# Patient Record
Sex: FEMALE | Race: WHITE | NOT HISPANIC OR LATINO
[De-identification: names, ages, dates, MRNs, and addresses within clinical notes are randomized per-mention and may not be internally consistent; named-entity substitution may affect disease eponyms.]

---

## 2017-11-02 ENCOUNTER — RESULT REVIEW (OUTPATIENT)
Age: 47
End: 2017-11-02

## 2018-01-31 ENCOUNTER — RESULT REVIEW (OUTPATIENT)
Age: 48
End: 2018-01-31

## 2018-08-21 ENCOUNTER — OUTPATIENT (OUTPATIENT)
Dept: OUTPATIENT SERVICES | Facility: HOSPITAL | Age: 48
LOS: 1 days | End: 2018-08-21
Payer: COMMERCIAL

## 2018-08-21 ENCOUNTER — APPOINTMENT (OUTPATIENT)
Dept: ULTRASOUND IMAGING | Facility: IMAGING CENTER | Age: 48
End: 2018-08-21
Payer: COMMERCIAL

## 2018-08-21 DIAGNOSIS — Z00.8 ENCOUNTER FOR OTHER GENERAL EXAMINATION: ICD-10-CM

## 2018-08-21 PROCEDURE — C1739: CPT

## 2018-08-21 PROCEDURE — 19285 PERQ DEV BREAST 1ST US IMAG: CPT

## 2018-08-21 PROCEDURE — 19285 PERQ DEV BREAST 1ST US IMAG: CPT | Mod: RT

## 2018-08-28 ENCOUNTER — OUTPATIENT (OUTPATIENT)
Dept: OUTPATIENT SERVICES | Facility: HOSPITAL | Age: 48
LOS: 1 days | End: 2018-08-28
Payer: COMMERCIAL

## 2018-08-28 VITALS
RESPIRATION RATE: 16 BRPM | OXYGEN SATURATION: 100 % | DIASTOLIC BLOOD PRESSURE: 64 MMHG | WEIGHT: 115.08 LBS | HEART RATE: 72 BPM | SYSTOLIC BLOOD PRESSURE: 98 MMHG | HEIGHT: 69 IN | TEMPERATURE: 98 F

## 2018-08-28 DIAGNOSIS — N63.0 UNSPECIFIED LUMP IN UNSPECIFIED BREAST: ICD-10-CM

## 2018-08-28 DIAGNOSIS — Z01.818 ENCOUNTER FOR OTHER PREPROCEDURAL EXAMINATION: ICD-10-CM

## 2018-08-28 DIAGNOSIS — D24.1 BENIGN NEOPLASM OF RIGHT BREAST: ICD-10-CM

## 2018-08-28 LAB
HCT VFR BLD CALC: 35.9 % — SIGNIFICANT CHANGE UP (ref 34.5–45)
HGB BLD-MCNC: 11.5 G/DL — SIGNIFICANT CHANGE UP (ref 11.5–15.5)
MCHC RBC-ENTMCNC: 30.4 PG — SIGNIFICANT CHANGE UP (ref 27–34)
MCHC RBC-ENTMCNC: 32 GM/DL — SIGNIFICANT CHANGE UP (ref 32–36)
MCV RBC AUTO: 95 FL — SIGNIFICANT CHANGE UP (ref 80–100)
PLATELET # BLD AUTO: 298 K/UL — SIGNIFICANT CHANGE UP (ref 150–400)
RBC # BLD: 3.78 M/UL — LOW (ref 3.8–5.2)
RBC # FLD: 13.4 % — SIGNIFICANT CHANGE UP (ref 10.3–14.5)
WBC # BLD: 5.37 K/UL — SIGNIFICANT CHANGE UP (ref 3.8–10.5)
WBC # FLD AUTO: 5.37 K/UL — SIGNIFICANT CHANGE UP (ref 3.8–10.5)

## 2018-08-28 PROCEDURE — G0463: CPT

## 2018-08-28 PROCEDURE — 85027 COMPLETE CBC AUTOMATED: CPT

## 2018-08-28 RX ORDER — SODIUM CHLORIDE 9 MG/ML
3 INJECTION INTRAMUSCULAR; INTRAVENOUS; SUBCUTANEOUS EVERY 8 HOURS
Qty: 0 | Refills: 0 | Status: DISCONTINUED | OUTPATIENT
Start: 2018-09-06 | End: 2018-09-21

## 2018-08-28 RX ORDER — LIDOCAINE HCL 20 MG/ML
0.2 VIAL (ML) INJECTION ONCE
Qty: 0 | Refills: 0 | Status: DISCONTINUED | OUTPATIENT
Start: 2018-09-06 | End: 2018-09-21

## 2018-08-28 NOTE — H&P PST ADULT - NSANTHOSAYNRD_GEN_A_CORE
No. KHLOE screening performed.  STOP BANG Legend: 0-2 = LOW Risk; 3-4 = INTERMEDIATE Risk; 5-8 = HIGH Risk

## 2018-08-28 NOTE — H&P PST ADULT - ATTENDING COMMENTS
The patient is a 48 year old female who recently underwent a right 12:00 breast FNA with pathology revealing atypical findings, cannot rule out papillary lesion.  She presents to undergo a right hamlet  localization breast biopsy.    The above physical notes a right breast mass. On my exam, no right breast mass noted.

## 2018-08-28 NOTE — H&P PST ADULT - HISTORY OF PRESENT ILLNESS
48 year old female  reports having abnormal mammogram/sonogram with right breast lump, scheduled for biopsy ^& excision on 09/06/18.

## 2018-08-31 ENCOUNTER — RESULT REVIEW (OUTPATIENT)
Age: 48
End: 2018-08-31

## 2018-09-05 ENCOUNTER — TRANSCRIPTION ENCOUNTER (OUTPATIENT)
Age: 48
End: 2018-09-05

## 2018-09-06 ENCOUNTER — RESULT REVIEW (OUTPATIENT)
Age: 48
End: 2018-09-06

## 2018-09-06 ENCOUNTER — OUTPATIENT (OUTPATIENT)
Dept: OUTPATIENT SERVICES | Facility: HOSPITAL | Age: 48
LOS: 1 days | End: 2018-09-06
Payer: COMMERCIAL

## 2018-09-06 VITALS
WEIGHT: 115.08 LBS | SYSTOLIC BLOOD PRESSURE: 18 MMHG | OXYGEN SATURATION: 100 % | HEART RATE: 52 BPM | HEIGHT: 69 IN | RESPIRATION RATE: 16 BRPM | TEMPERATURE: 98 F | DIASTOLIC BLOOD PRESSURE: 52 MMHG

## 2018-09-06 VITALS
SYSTOLIC BLOOD PRESSURE: 98 MMHG | RESPIRATION RATE: 18 BRPM | OXYGEN SATURATION: 100 % | HEART RATE: 64 BPM | DIASTOLIC BLOOD PRESSURE: 56 MMHG | TEMPERATURE: 98 F

## 2018-09-06 DIAGNOSIS — Z01.818 ENCOUNTER FOR OTHER PREPROCEDURAL EXAMINATION: ICD-10-CM

## 2018-09-06 DIAGNOSIS — D24.1 BENIGN NEOPLASM OF RIGHT BREAST: ICD-10-CM

## 2018-09-06 PROCEDURE — 88307 TISSUE EXAM BY PATHOLOGIST: CPT | Mod: 26

## 2018-09-06 PROCEDURE — 19125 EXCISION BREAST LESION: CPT | Mod: RT

## 2018-09-06 PROCEDURE — 76098 X-RAY EXAM SURGICAL SPECIMEN: CPT

## 2018-09-06 PROCEDURE — 76098 X-RAY EXAM SURGICAL SPECIMEN: CPT | Mod: 26

## 2018-09-06 PROCEDURE — 88307 TISSUE EXAM BY PATHOLOGIST: CPT

## 2018-09-06 RX ORDER — ACETAMINOPHEN 500 MG
1000 TABLET ORAL ONCE
Qty: 0 | Refills: 0 | Status: COMPLETED | OUTPATIENT
Start: 2018-09-06 | End: 2018-09-06

## 2018-09-06 RX ORDER — CELECOXIB 200 MG/1
200 CAPSULE ORAL ONCE
Qty: 0 | Refills: 0 | Status: COMPLETED | OUTPATIENT
Start: 2018-09-06 | End: 2018-09-06

## 2018-09-06 RX ORDER — CELECOXIB 200 MG/1
200 CAPSULE ORAL ONCE
Qty: 0 | Refills: 0 | Status: DISCONTINUED | OUTPATIENT
Start: 2018-09-06 | End: 2018-09-21

## 2018-09-06 RX ORDER — OXYCODONE HYDROCHLORIDE 5 MG/1
10 TABLET ORAL ONCE
Qty: 0 | Refills: 0 | Status: DISCONTINUED | OUTPATIENT
Start: 2018-09-06 | End: 2018-09-06

## 2018-09-06 RX ORDER — OXYCODONE HYDROCHLORIDE 5 MG/1
5 TABLET ORAL ONCE
Qty: 0 | Refills: 0 | Status: DISCONTINUED | OUTPATIENT
Start: 2018-09-06 | End: 2018-09-06

## 2018-09-06 RX ORDER — SODIUM CHLORIDE 9 MG/ML
1000 INJECTION, SOLUTION INTRAVENOUS
Qty: 0 | Refills: 0 | Status: DISCONTINUED | OUTPATIENT
Start: 2018-09-06 | End: 2018-09-21

## 2018-09-06 RX ORDER — ONDANSETRON 8 MG/1
4 TABLET, FILM COATED ORAL ONCE
Qty: 0 | Refills: 0 | Status: DISCONTINUED | OUTPATIENT
Start: 2018-09-06 | End: 2018-09-21

## 2018-09-06 RX ADMIN — Medication 1000 MILLIGRAM(S): at 12:11

## 2018-09-06 RX ADMIN — CELECOXIB 200 MILLIGRAM(S): 200 CAPSULE ORAL at 12:11

## 2018-09-06 NOTE — ASU PATIENT PROFILE, ADULT - VISION (WITH CORRECTIVE LENSES IF THE PATIENT USUALLY WEARS THEM):
Normal vision: sees adequately in most situations; can see medication labels, newsprint reading/Normal vision: sees adequately in most situations; can see medication labels, newsprint

## 2018-09-06 NOTE — BRIEF OPERATIVE NOTE - PROCEDURE
<<-----Click on this checkbox to enter Procedure Breast biopsy, right  09/06/2018  with hamlet  localization  Active  MENA

## 2018-09-12 LAB — SURGICAL PATHOLOGY STUDY: SIGNIFICANT CHANGE UP

## 2018-11-05 ENCOUNTER — RESULT REVIEW (OUTPATIENT)
Age: 48
End: 2018-11-05

## 2019-02-05 PROBLEM — N63.0 UNSPECIFIED LUMP IN UNSPECIFIED BREAST: Chronic | Status: ACTIVE | Noted: 2018-08-28

## 2019-02-05 PROBLEM — M25.552 LEFT HIP PAIN: Status: ACTIVE | Noted: 2019-02-05

## 2019-02-06 ENCOUNTER — APPOINTMENT (OUTPATIENT)
Dept: ORTHOPEDIC SURGERY | Facility: CLINIC | Age: 49
End: 2019-02-06
Payer: COMMERCIAL

## 2019-02-06 VITALS
DIASTOLIC BLOOD PRESSURE: 56 MMHG | SYSTOLIC BLOOD PRESSURE: 85 MMHG | HEIGHT: 69 IN | WEIGHT: 115 LBS | BODY MASS INDEX: 17.03 KG/M2 | HEART RATE: 60 BPM

## 2019-02-06 DIAGNOSIS — M25.551 PAIN IN RIGHT HIP: ICD-10-CM

## 2019-02-06 DIAGNOSIS — M25.552 PAIN IN LEFT HIP: ICD-10-CM

## 2019-02-06 DIAGNOSIS — Z78.9 OTHER SPECIFIED HEALTH STATUS: ICD-10-CM

## 2019-02-06 PROCEDURE — 73522 X-RAY EXAM HIPS BI 3-4 VIEWS: CPT

## 2019-02-06 PROCEDURE — 99204 OFFICE O/P NEW MOD 45 MIN: CPT

## 2019-02-06 PROCEDURE — 72100 X-RAY EXAM L-S SPINE 2/3 VWS: CPT

## 2019-02-06 NOTE — PHYSICAL EXAM
[de-identified] : The patient is well nourished, well-developed, in no acute distress, with appropriate mood and affect. The patient is oriented to time, place, and person. Respirations are even and unlabored. Gait evaluation does not reveal a limp. There is no inguinal adenopathy. \par The right limb is well-perfused and showed 2+ dp/pt pulses, without skin lesions, shows a grossly normal motor and sensory examination. Examination of the hip shows no skin lesions. Hip motion is full and painless from 0-90 degrees extension to flexion, 20 degrees adduction and 20 degrees abduction, and 15 degrees internal and 30 degrees external rotation. Stinchfield test is negative. FADIR test is negative. MELODY test is negative. The left limb is well-perfused and showed 2+ dp/pt pulses, without skin lesions, shows a grossly normal motor and sensory examination. Examination of the hip shows no skin lesions. Hip motion is full and painless from 0-90 degrees extension to flexion, 20 degrees adduction and 20 degrees abduction, and 15 degrees internal and 30 degrees external rotation. Stinchfield test is negative.  FADIR test is negative. MELODY test is negative. Leg lengths are approximately equal. Both hips are stable and muscle strength is normal with good strength with resisted abduction and adduction. Pedal pulses are palpable.\par Examination of the lumbar spine reveals full range of motion without pain. There is no tenderness to palpation of the osseous structures or paravertebral soft tissues.  Tender to palpation over the left buttock.  There is no muscle spasm. Straight leg raise is negative bilaterally. [de-identified] : AP and lateral x-rays of the bilateral hip, pelvis, and femur were ordered and taken in the office and demonstrate no evidence of degenerative joint disease of the hip with maintained joint space and no evidence of fractures or other intraarticular pathology.  \par \par AP and lateral x-rays of the lumbar spine were ordered and taken in the office and demonstrate no evidence of degenerative disc disease or spondylolysis or spondylolisthesis.

## 2019-02-06 NOTE — DISCUSSION/SUMMARY
[de-identified] : This patient has acute low back pain from a fall.  She may have a herniated disc versus a muscle spasm.  Given the lack of relief over time and the mild improvement with NSAIDs I will prescribe a Medrol Dosepak.  She will call me in 1 week if this is not improving and we will consider an MRI.  Follow-up PRN.

## 2019-02-06 NOTE — HISTORY OF PRESENT ILLNESS
[de-identified] : This is very nice 48-year-old female experiencing left low back pain, which is severe in intensity.  The pain started approximately 10 days ago after she flexed forward to pick something up off the ground.  From her memory she actually remembers falling onto the left knee about 2 weeks ago and she thinks this may have flared up the back.  She denies any numbness or tingling or weakness radiating down her legs to the feet.  No bowel or bladder incontinence.  No groin pain.  The pain substantially limits activities of daily living. Walking tolerance is reduced.  She takes NSAIDs for this which helped mildly.  She has not tried physical therapy for this.  She did try to go to a chiropractor which did not help.  She does not use a cane or walker.

## 2019-06-07 ENCOUNTER — APPOINTMENT (OUTPATIENT)
Dept: MRI IMAGING | Facility: CLINIC | Age: 49
End: 2019-06-07
Payer: COMMERCIAL

## 2019-06-07 ENCOUNTER — OUTPATIENT (OUTPATIENT)
Dept: OUTPATIENT SERVICES | Facility: HOSPITAL | Age: 49
LOS: 1 days | End: 2019-06-07
Payer: COMMERCIAL

## 2019-06-07 DIAGNOSIS — Z00.8 ENCOUNTER FOR OTHER GENERAL EXAMINATION: ICD-10-CM

## 2019-06-07 PROCEDURE — 73718 MRI LOWER EXTREMITY W/O DYE: CPT | Mod: 26,LT

## 2019-06-07 PROCEDURE — 73718 MRI LOWER EXTREMITY W/O DYE: CPT

## 2019-06-18 ENCOUNTER — APPOINTMENT (OUTPATIENT)
Dept: ORTHOPEDIC SURGERY | Facility: CLINIC | Age: 49
End: 2019-06-18
Payer: COMMERCIAL

## 2019-06-18 DIAGNOSIS — M54.5 LOW BACK PAIN: ICD-10-CM

## 2019-06-18 PROCEDURE — 99213 OFFICE O/P EST LOW 20 MIN: CPT

## 2019-06-18 NOTE — HISTORY OF PRESENT ILLNESS
[de-identified] : This is very nice 49-year-old female experiencing left low back pain, which is moderate in intensity.  I first evaluated her for this approximately 4 months ago and she is now feeling better from that.  That started after a fall.  I gave her Medrol Dosepak at the time and that significantly helped her radiculopathy.  She never followed up with a physiatry us.  Whenever she leans forward though or sits for long period of time in a flexed forward position she develops the pain in her buttock again.  Intimately will radiate down her left leg to the foot down the posterior aspect of the leg.  This is not associated with numbness or tingling or weakness.  No bowel or bladder incontinence.  No groin pain.  Is not currently taking pain medication.  She is previously tried a course of physical therapy which did not help to make the pain better.  Ultimately she will take NSAIDs which do help but that causes reflux symptoms. The pain mildly limits activities of daily living. Walking tolerance is not reduced.  She did try to go to a chiropractor which did not help.  She does not use a cane or walker.

## 2019-06-18 NOTE — PHYSICAL EXAM
[de-identified] : AP and lateral x-rays of the bilateral hip, pelvis, and femur were reviewed from the previous visit and demonstrate no evidence of degenerative joint disease of the hip with maintained joint space and no evidence of fractures or other intraarticular pathology.  \par \par AP and lateral x-rays of the lumbar spine were reviewed from the previous visit and demonstrate no evidence of degenerative disc disease or spondylolysis or spondylolisthesis. [de-identified] : The patient is well nourished, well-developed, in no acute distress, with appropriate mood and affect. The patient is oriented to time, place, and person. Respirations are even and unlabored. Gait evaluation does not reveal a limp. There is no inguinal adenopathy. \par The right limb is well-perfused and showed 2+ dp/pt pulses, without skin lesions, shows a grossly normal motor and sensory examination. Examination of the hip shows no skin lesions. Hip motion is full and painless from 0-90 degrees extension to flexion, 20 degrees adduction and 20 degrees abduction, and 15 degrees internal and 30 degrees external rotation. Stinchfield test is negative. FADIR test is negative. MELODY test is negative. The left limb is well-perfused and showed 2+ dp/pt pulses, without skin lesions, shows a grossly normal motor and sensory examination. Examination of the hip shows no skin lesions. Hip motion is full and painless from 0-90 degrees extension to flexion, 20 degrees adduction and 20 degrees abduction, and 15 degrees internal and 30 degrees external rotation. Stinchfield test is negative.  FADIR test is negative. MELODY test is negative. Leg lengths are approximately equal. Both hips are stable and muscle strength is normal with good strength with resisted abduction and adduction. Pedal pulses are palpable.\par Examination of the lumbar spine reveals full range of motion without pain. There is no tenderness to palpation of the osseous structures or paravertebral soft tissues.  Tender to palpation over the left buttock and over the left sacroiliac joint.  There is no muscle spasm. Straight leg raise is negative bilaterally.

## 2019-06-18 NOTE — DISCUSSION/SUMMARY
[de-identified] : This patient has acute low back pain with evidence of radiculopathy on examination.  Given the chronicity of the symptoms and the fact that she is Jeffery failed a course of conservative management I would like to obtain an MRI of the lumbar spine to evaluate for herniated disc.  I told the patient I will discuss with her over the telephone the MRI imaging once it is complete and that we we can discuss next steps.  Likely she will need to follow-up with a physiatry us for selective nerve root injections.

## 2019-06-20 ENCOUNTER — OTHER (OUTPATIENT)
Age: 49
End: 2019-06-20

## 2019-07-08 ENCOUNTER — OUTPATIENT (OUTPATIENT)
Dept: OUTPATIENT SERVICES | Facility: HOSPITAL | Age: 49
LOS: 1 days | End: 2019-07-08
Payer: COMMERCIAL

## 2019-07-08 ENCOUNTER — APPOINTMENT (OUTPATIENT)
Dept: MRI IMAGING | Facility: CLINIC | Age: 49
End: 2019-07-08
Payer: COMMERCIAL

## 2019-07-08 DIAGNOSIS — M54.5 LOW BACK PAIN: ICD-10-CM

## 2019-07-08 DIAGNOSIS — R59.1 GENERALIZED ENLARGED LYMPH NODES: ICD-10-CM

## 2019-07-08 PROCEDURE — 72148 MRI LUMBAR SPINE W/O DYE: CPT | Mod: 26

## 2019-07-08 PROCEDURE — 72148 MRI LUMBAR SPINE W/O DYE: CPT

## 2019-07-26 ENCOUNTER — CLINICAL ADVICE (OUTPATIENT)
Age: 49
End: 2019-07-26

## 2019-11-14 ENCOUNTER — RESULT REVIEW (OUTPATIENT)
Age: 49
End: 2019-11-14

## 2020-09-15 ENCOUNTER — APPOINTMENT (OUTPATIENT)
Dept: ORTHOPEDIC SURGERY | Facility: CLINIC | Age: 50
End: 2020-09-15
Payer: COMMERCIAL

## 2020-09-15 VITALS — TEMPERATURE: 98.6 F

## 2020-09-15 PROCEDURE — 99214 OFFICE O/P EST MOD 30 MIN: CPT

## 2020-09-15 PROCEDURE — 73564 X-RAY EXAM KNEE 4 OR MORE: CPT | Mod: 50

## 2020-09-15 RX ORDER — FLUTICASONE PROPIONATE 50 UG/1
50 SPRAY, METERED NASAL
Qty: 16 | Refills: 0 | Status: ACTIVE | COMMUNITY
Start: 2020-03-30

## 2020-09-15 NOTE — DISCUSSION/SUMMARY
[de-identified] : This patient has normal bilateral knee exams and normal radiographs.  She may have some extra-articular pain secondary to tight muscles from her lumbar radiculopathy.  An extensive discussion was conducted on the natural history of the disease and the variety of surgical and non-surgical options available to the patient including, but not limited to non-steroidal anti-inflammatory medications, steroid injections, physical therapy, maintenance of ideal body weight, and reduction of activity.  Recommended a course of physical therapy.  She can also he take meloxicam for 2 to 3 weeks for pain.  The patient will schedule an appointment as needed.\par

## 2020-09-15 NOTE — HISTORY OF PRESENT ILLNESS
[de-identified] : This is very nice 50-year-old female experiencing B/L knee pain x 3-4 days. She was last seen in June 2019 for L sided lower back pain with radiculopathy and was recommended to follow up with physiatry. Denies any trauma to her knees. Pain is located anteriorly. Describes pain as a burning sensation. Pain does not radiate. Laying down and bending her knee aggravates the pain. Denies clicking or popping sensation of her knees. Denies instability.  She has no pain during the day.  No pain with long distance walking.  This does not limit her activities of daily living.  It does not limit her walking tolerance.  No pain going up and down stairs.  No pain with squatting.  Only hurts at night when she lies down. Regarding the back pain, she recently started experiencing pain again when she went back to work as a . Prolonged sitting aggravates the pain. Pain radiates to L buttock but not down the leg. Denies numbness/tingling. She had went to the physiatrist last year which had recommended to PT, and states that PT relieved her back pain.

## 2020-09-15 NOTE — PHYSICAL EXAM
[de-identified] : Patient is well nourished, well-developed, in no acute distress, with appropriate mood and affect. The patient is oriented to time, place, and person. Respirations are even and unlabored. Gait evaluation does not reveal a limp. There is no inguinal adenopathy. The right limb is well-perfused and showed 2+ dp/pt pulses, without skin lesions, shows a grossly normal motor and sensory examination. Right knee motion is painless and the knee moves from 0 to 135 degrees. The knee is stable within that range-of-motion to AP and ML stress with a 1A Lachman, negative anterior or posterior drawer and no instability to varus or valgus stress. The alignment of the knee is 5 degrees varus. No effusion or crepitus is noted. No tenderness to palpation about the medial or lateral joint line, medial or lateral tibial plateau, medial or lateral femoral condyle, medial or lateral patellar facets, superior or inferior pole of the patella. Anne's is negative. Muscle strength is normal. Pedal pulses are palpable. Hip examination was negative. The left limb is well-perfused and showed 2+ dp/pt pulses, without skin lesions, shows a grossly normal motor and sensory examination. Left knee motion is painless and the knee moves from 0 to 135 degrees. The knee is stable within that range-of-motion to AP and ML stress with a 1A Lachman, negative anterior or posterior drawer and no instability to varus or valgus stress. The alignment of the knee is 5 degrees varus. No effusion or crepitus is noted. No tenderness to palpation about the medial or lateral joint line, medial or lateral tibial plateau, medial or lateral femoral condyle, medial or lateral patellar facets, superior or inferior pole of the patella. Anne's is negative. Muscle strength is normal. Pedal pulses are palpable. Hip examination was negative. [de-identified] : Long standing knee, AP knee, lateral knee, and patellar views of the bilateral knee were ordered and taken in the office and demonstrate no evidence of degenerative joint disease of the knee, fractures, intra-articular pathology.

## 2020-10-08 ENCOUNTER — TRANSCRIPTION ENCOUNTER (OUTPATIENT)
Age: 50
End: 2020-10-08

## 2020-10-11 ENCOUNTER — TRANSCRIPTION ENCOUNTER (OUTPATIENT)
Age: 50
End: 2020-10-11

## 2020-12-04 ENCOUNTER — TRANSCRIPTION ENCOUNTER (OUTPATIENT)
Age: 50
End: 2020-12-04

## 2021-01-29 ENCOUNTER — TRANSCRIPTION ENCOUNTER (OUTPATIENT)
Age: 51
End: 2021-01-29

## 2021-03-22 ENCOUNTER — APPOINTMENT (OUTPATIENT)
Dept: OBGYN | Facility: CLINIC | Age: 51
End: 2021-03-22

## 2021-03-26 ENCOUNTER — APPOINTMENT (OUTPATIENT)
Dept: PULMONOLOGY | Facility: CLINIC | Age: 51
End: 2021-03-26
Payer: COMMERCIAL

## 2021-03-26 VITALS
RESPIRATION RATE: 16 BRPM | BODY MASS INDEX: 17.63 KG/M2 | HEIGHT: 69 IN | SYSTOLIC BLOOD PRESSURE: 100 MMHG | DIASTOLIC BLOOD PRESSURE: 60 MMHG | WEIGHT: 119 LBS | OXYGEN SATURATION: 99 % | HEART RATE: 72 BPM | TEMPERATURE: 97 F

## 2021-03-26 DIAGNOSIS — Z83.49 FAMILY HISTORY OF OTHER ENDOCRINE, NUTRITIONAL AND METABOLIC DISEASES: ICD-10-CM

## 2021-03-26 DIAGNOSIS — Z83.3 FAMILY HISTORY OF DIABETES MELLITUS: ICD-10-CM

## 2021-03-26 DIAGNOSIS — Z86.19 PERSONAL HISTORY OF OTHER INFECTIOUS AND PARASITIC DISEASES: ICD-10-CM

## 2021-03-26 DIAGNOSIS — Z80.8 FAMILY HISTORY OF MALIGNANT NEOPLASM OF OTHER ORGANS OR SYSTEMS: ICD-10-CM

## 2021-03-26 DIAGNOSIS — N63.0 UNSPECIFIED LUMP IN UNSPECIFIED BREAST: ICD-10-CM

## 2021-03-26 PROCEDURE — 99204 OFFICE O/P NEW MOD 45 MIN: CPT | Mod: 25

## 2021-03-26 PROCEDURE — 71046 X-RAY EXAM CHEST 2 VIEWS: CPT

## 2021-03-26 PROCEDURE — 99072 ADDL SUPL MATRL&STAF TM PHE: CPT

## 2021-03-26 RX ORDER — PANTOPRAZOLE 40 MG/1
40 TABLET, DELAYED RELEASE ORAL
Refills: 0 | Status: ACTIVE | COMMUNITY

## 2021-03-26 RX ORDER — PROMETHAZINE HYDROCHLORIDE AND DEXTROMETHORPHAN HYDROBROMIDE ORAL SOLUTION 15; 6.25 MG/5ML; MG/5ML
6.25-15 SOLUTION ORAL
Qty: 473 | Refills: 0 | Status: DISCONTINUED | COMMUNITY
Start: 2020-03-30 | End: 2021-03-26

## 2021-03-26 RX ORDER — METHYLPREDNISOLONE 4 MG/1
4 TABLET ORAL
Qty: 1 | Refills: 0 | Status: DISCONTINUED | COMMUNITY
Start: 2019-02-06 | End: 2021-03-26

## 2021-03-26 RX ORDER — OXYCODONE AND ACETAMINOPHEN 5; 325 MG/1; MG/1
5-325 TABLET ORAL
Qty: 20 | Refills: 0 | Status: DISCONTINUED | COMMUNITY
Start: 2020-07-13 | End: 2021-03-26

## 2021-03-26 RX ORDER — AZITHROMYCIN 250 MG/1
250 TABLET, FILM COATED ORAL
Qty: 6 | Refills: 0 | Status: DISCONTINUED | COMMUNITY
Start: 2020-03-30 | End: 2021-03-26

## 2021-03-26 RX ORDER — AMOXICILLIN 500 MG/1
500 CAPSULE ORAL
Qty: 30 | Refills: 0 | Status: DISCONTINUED | COMMUNITY
Start: 2020-07-13 | End: 2021-03-26

## 2021-03-26 RX ORDER — MELOXICAM 15 MG/1
15 TABLET ORAL DAILY
Qty: 30 | Refills: 0 | Status: DISCONTINUED | COMMUNITY
Start: 2020-09-15 | End: 2021-03-26

## 2021-03-26 NOTE — ASSESSMENT
[FreeTextEntry1] : Ms. MENESES  is a 51 year old female with a history of originally from [place], likely remote chicken pox as child, arthritic symptoms, GERD, poor sleep, benign breast lump, nonsmoker, scoliosis who now comes to the office for an initial pulmonary evaluation for abnormal CT / SOB \par \par Her shortness of breath is multifactorial due to:\par -poor mechanics of breathing \par -out of shape \par - pulmonary disease\par    - ?asthma \par -?cardiac disease (doubtful)\par \par \par problem 1: asthma \par - get full PFTs f/p \par  - Asthma is believed to be caused by inherited (genetic) and environmental factor, but its exact cause is unknown. Asthma may be triggered by allergens, lung infections, or irritants in the air. Asthma triggers are different for each person.\par \par \par problem 2: GERD / reflux \par -continue Pantoprazole 40 mg QAM  \par - recommended to limit caffeine intake  \par -Rule of 2s: avoid eating too much, eating too late, eating too spicy, eating two hours before bed.\par -Things to avoid including overeating, spicy foods, tight clothing, eating within three hours of bed, this list is not all inclusive. \par -For treatment of reflux, possible options discussed including diet control, H2 blockers, PPIs, as well as coating motility agents discussed as treatment options. Timing of meals and proximity of last meal to sleep were discussed. If symptoms persist, a formal gastrointestinal evaluation is needed.\par \par \par \par Problem 3: poor sleep / fatigue / ?pre-menopausal / ?OSAS\par - get home sleep study \par Sleep apnea is associated with adverse clinical consequences which can affect most organ systems.  Cardiovascular disease risk includes arrhythmias, atrial fibrillation, hypertension, coronary artery disease, and stroke. Metabolic disorders include diabetes type 2, non-alcoholic fatty liver disease. Mood disorder especially depression; and cognitive decline especially in the elderly. Associations with  chronic reflux/Mathew’s esophagus some but not all inclusive. \par -Reasons  include arousal consistent with hypopnea; respiratory events most prominent in REM sleep or supine position; therefore sleep staging and body position are important for accurate diagnosis and estimation of AHI. \par \par \par Problem 4: abnormal CT - finding c/w benign abnormalities - less likely malignancy \par -  ?chicken pox, ?mycobacterium other than TB/?TB, ?hypersensitivity pneumonitis, ?sarcoidosis - less likely malignancy \par - get blood work: ESR, ACE level, hypersensitivity pneumonitis panel, Quatiferon Gold test \par - get f/p CT in 3 months 5/2021 \par - no role for bronchoscopy or any other biopsy unless CT shows any changes \par CAT scans are the only radiological modality to identify abnormalities w/in the lungs with regards to nodules/masses/lymph nodes. Risks, benefits were reviewed in detail. The guidelines for abnormalities include follow up CT scans at various intervals which could range from 6 weeks to 1 year intervals. If there is a change for the worse then consideration for a biopsy will be considered if you are a candidate. Second opinion evaluation with a thoracic surgeon or an interventional radiologist could be offered. \par \par \par problem 5 : cardiac disease\par -recommended to continue to follow up with Cardiologist if needed \par \par problem  6: poor breathing mechanics\par -Proper breathing techniques were reviewed with an emphasis of exhalation. Patient instructed to breath in for 1 second and out for four seconds. Patient was encouraged to not talk while walking. \par \par \par problem 7: health maintenance \par -recommended yearly flu shot after October 15\par -recommended strep pneumonia vaccines: Prevnar-13 vaccine, followed by Pneumo vaccine 23 one year following after 65 years old. \par -recommended early intervention for Upper Respiratory Infections (URIs)\par -recommended regular osteoporosis evaluations\par -recommended early dermatological evaluations\par -recommended after the age of 50 to the age of 70, colonoscopy every 5 years\par \par F/U in 6-8 weeks.\par She is encouraged to call with any changes, concerns, or questions\par \par

## 2021-03-26 NOTE — PROCEDURE
[FreeTextEntry1] :  CXR reveals  normal sized heart; there was no evidence of infiltrate or effusion, scoliosis to the left  \par \par \par \par CT (2/9/2021) impression - Mild apical pleural parenchymal scarring, 2 mm right upper lobe nodule, bilobed right upper lobe nodule, 4mm left upper lobe 5 mm in size ?endobronchial in origin.

## 2021-03-26 NOTE — ADDENDUM
[FreeTextEntry1] : Documented by Melissa Becker acting as a scribe for Dr. Dimitri Gale on 03/26/2021 \par \par All medical record entries made by the Scribe were at my, Dr. Dimitri Gale's, direction and personally dictated by me on 03/26/2021 . I have reviewed the chart and agree that the record accurately reflects my personal performance of the history, physical exam, assessment and plan. I have also personally directed, reviewed, and agree with the discharge instructions.

## 2021-04-06 LAB
ACE BLD-CCNC: 36 U/L
ERYTHROCYTE [SEDIMENTATION RATE] IN BLOOD BY WESTERGREN METHOD: 2 MM/HR
M TB IFN-G BLD-IMP: NEGATIVE
QUANTIFERON TB PLUS MITOGEN MINUS NIL: 8.88 IU/ML
QUANTIFERON TB PLUS NIL: 0.01 IU/ML
QUANTIFERON TB PLUS TB1 MINUS NIL: 0 IU/ML
QUANTIFERON TB PLUS TB2 MINUS NIL: 0 IU/ML

## 2021-04-07 LAB
ALTERN TENCAPG(M6): 3.9 MCG/ML
ASPER FUMCAPG(M3): 23.3 MCG/ML
AUREOBASCAPG(M12): 5.8 MCG/ML
MICROPOLYCAPG(M22): <2 MCG/ML
PENIC CHRYCAPG(M1): 20.5 MCG/ML
PHOMA BETAE IGG: 3.5 MCG/ML
THERMOCAPG(M23): <2 MCG/ML
TRICHODERMA VIRIDE IGG: 4.1 MCG/ML

## 2021-04-23 ENCOUNTER — NON-APPOINTMENT (OUTPATIENT)
Age: 51
End: 2021-04-23

## 2021-04-27 ENCOUNTER — APPOINTMENT (OUTPATIENT)
Dept: OBGYN | Facility: CLINIC | Age: 51
End: 2021-04-27
Payer: COMMERCIAL

## 2021-04-27 VITALS
HEIGHT: 69 IN | BODY MASS INDEX: 17.77 KG/M2 | DIASTOLIC BLOOD PRESSURE: 70 MMHG | WEIGHT: 120 LBS | SYSTOLIC BLOOD PRESSURE: 110 MMHG

## 2021-04-27 LAB
BILIRUB UR QL STRIP: NORMAL
CLARITY UR: CLEAR
COLLECTION METHOD: NORMAL
GLUCOSE UR-MCNC: NORMAL
HCG UR QL: 0.2 EU/DL
HGB UR QL STRIP.AUTO: NORMAL
KETONES UR-MCNC: NORMAL
LEUKOCYTE ESTERASE UR QL STRIP: NORMAL
NITRITE UR QL STRIP: NORMAL
PH UR STRIP: 7
PROT UR STRIP-MCNC: NORMAL
SP GR UR STRIP: 1.01

## 2021-04-27 PROCEDURE — 82274 ASSAY TEST FOR BLOOD FECAL: CPT | Mod: QW

## 2021-04-27 PROCEDURE — 36415 COLL VENOUS BLD VENIPUNCTURE: CPT

## 2021-04-27 PROCEDURE — 99396 PREV VISIT EST AGE 40-64: CPT

## 2021-04-27 PROCEDURE — 81003 URINALYSIS AUTO W/O SCOPE: CPT | Mod: QW

## 2021-04-27 PROCEDURE — 99072 ADDL SUPL MATRL&STAF TM PHE: CPT

## 2021-04-28 LAB
ESTRADIOL SERPL-MCNC: 60 PG/ML
FSH SERPL-MCNC: 66.5 IU/L
HPV HIGH+LOW RISK DNA PNL CVX: NOT DETECTED
LH SERPL-ACNC: 34.7 IU/L
TSH SERPL-ACNC: 1.12 UIU/ML
VZV AB TITR SER: POSITIVE
VZV IGG SER IF-ACNC: 2891 INDEX

## 2021-05-02 LAB — CYTOLOGY CVX/VAG DOC THIN PREP: NORMAL

## 2021-05-04 DIAGNOSIS — Z01.812 ENCOUNTER FOR PREPROCEDURAL LABORATORY EXAMINATION: ICD-10-CM

## 2021-05-04 LAB — CORE LAB BIOPSY: NORMAL

## 2021-05-18 ENCOUNTER — APPOINTMENT (OUTPATIENT)
Dept: OBGYN | Facility: CLINIC | Age: 51
End: 2021-05-18

## 2021-05-26 ENCOUNTER — OUTPATIENT (OUTPATIENT)
Dept: OUTPATIENT SERVICES | Facility: HOSPITAL | Age: 51
LOS: 1 days | End: 2021-05-26
Payer: COMMERCIAL

## 2021-05-26 ENCOUNTER — APPOINTMENT (OUTPATIENT)
Dept: SLEEP CENTER | Facility: CLINIC | Age: 51
End: 2021-05-26
Payer: COMMERCIAL

## 2021-05-26 PROCEDURE — 95806 SLEEP STUDY UNATT&RESP EFFT: CPT | Mod: 26

## 2021-05-26 PROCEDURE — 95806 SLEEP STUDY UNATT&RESP EFFT: CPT

## 2021-06-02 ENCOUNTER — APPOINTMENT (OUTPATIENT)
Dept: PULMONOLOGY | Facility: CLINIC | Age: 51
End: 2021-06-02
Payer: COMMERCIAL

## 2021-06-02 VITALS
BODY MASS INDEX: 17.89 KG/M2 | WEIGHT: 114 LBS | SYSTOLIC BLOOD PRESSURE: 110 MMHG | OXYGEN SATURATION: 98 % | DIASTOLIC BLOOD PRESSURE: 64 MMHG | TEMPERATURE: 97.3 F | HEIGHT: 67 IN | HEART RATE: 70 BPM | RESPIRATION RATE: 16 BRPM

## 2021-06-02 PROCEDURE — 99214 OFFICE O/P EST MOD 30 MIN: CPT | Mod: 25

## 2021-06-02 PROCEDURE — 94618 PULMONARY STRESS TESTING: CPT

## 2021-06-02 PROCEDURE — 95012 NITRIC OXIDE EXP GAS DETER: CPT

## 2021-06-02 PROCEDURE — 94010 BREATHING CAPACITY TEST: CPT

## 2021-06-02 PROCEDURE — 94729 DIFFUSING CAPACITY: CPT

## 2021-06-02 PROCEDURE — ZZZZZ: CPT

## 2021-06-02 PROCEDURE — 94727 GAS DIL/WSHOT DETER LNG VOL: CPT

## 2021-06-02 RX ORDER — FAMOTIDINE 40 MG/1
40 TABLET, FILM COATED ORAL
Qty: 90 | Refills: 0 | Status: ACTIVE | COMMUNITY
Start: 2021-05-27

## 2021-06-02 RX ORDER — NYSTATIN AND TRIAMCINOLONE ACETONIDE 100000; 1 [USP'U]/G; MG/G
100000-0.1 OINTMENT TOPICAL
Qty: 60 | Refills: 0 | Status: ACTIVE | COMMUNITY
Start: 2021-01-30

## 2021-06-02 NOTE — ADDENDUM
[FreeTextEntry1] : Documented by Kody Elizalde acting as a scribe for Dr. Dimitri Gale on 06/02/2021.\par \par All medical record entries made by the Scribe were at my, Dr. Dimitri Gale's, direction and personally dictated by me on 06/02/2021. I have reviewed the chart and agree that the record accurately reflects my personal performance of the history, physical exam, assessment and plan. I have also personally directed, reviewed, and agree with the discharge instructions.

## 2021-06-02 NOTE — PROCEDURE
[FreeTextEntry1] : Full PFT revealed normal flows, with a FEV1 of 2.87L, which is 95% of predicted, normal lung volumes, and a normal diffusion of 20.8, which is 103% of predicted, with a normal flow volume loop \par \par FENO was 8; a normal value being less than 25\par Fractional exhaled nitric oxide (FENO) is regarded as a simple, noninvasive method for assessing eosinophilic airway inflammation. Produced by a variety of cells within the lung, nitric oxide (NO) concentrations are generally low in healthy individuals. However, high concentrations of NO appear to be involved in nonspecific host defense mechanisms and chronic inflammatory diseases such as asthma. The American Thoracic Society (ATS) therefore has recommended using FENO to aid in the diagnosis and monitoring of eosinophilic airway inflammation and asthma, and for identifying steroid responsive individuals whose chronic respiratory symptoms may be caused by airway inflammation. \par \par 6 minute walk test reveals a low saturation of 95% with very slight dyspnea; walked 619.4 meters \par \par Chest CT (5/28/21) reveals a few areas of minor airway impaction, one new in the right middle lobe. No suspicious features. A few other tiny upper lung zone nodular densities are without substantial change and presumably airways related, best seen maximum intensity projection series 8. No concerning pulmonary nodule or dense consolidation. No lymphadenopathy.\par \par Sleep study (5/26/2021) revealed sleep apnea with an KARLEY of 0.7, snore index of 0% and a low oxygen saturation of 91%

## 2021-06-02 NOTE — HISTORY OF PRESENT ILLNESS
[TextBox_4] : Ms. MENESES is a 51 year old female presenting to the office today for a follow up pulmonary evaluation. Her chief complaint is poor sleep\par -she reports she has not yet gotten the vaccine\par -she states she has constant constipation\par -she notes she believe she is starting menopause\par -she notes she has poor sleep, sometimes difficulty falling asleep and sometimes waking up at night. She has difficulty falling asleep a few days before her period. She has always been a light sleeper\par -she states she is still having heartburn - every time she drinks coffee - drinking 3-4 light cups of coffee QD\par -she states she saw an ENT last week - Maria [facility] - found reflux signs worse on the left. She was placed on Pepcid 5 days ago\par -she reports her sinuses are clear\par -she reports she moves around a lot at night\par -she denies any coughing, wheezing, chest pain, chest pressure, diarrhea, constipation, dysphagia, dizziness, sour taste in the mouth, leg swelling, leg pain, itchy eyes, itchy ears, myalgias or arthralgias.

## 2021-06-02 NOTE — ASSESSMENT
[FreeTextEntry1] : Ms. MENESES  is a 51 year old female with a history of originally from [place], likely remote chicken pox as child, arthritic symptoms, GERD, poor sleep, benign breast lump, nonsmoker, scoliosis who now comes to the office for a follow up pulmonary evaluation for abnormal CT / SOB - poor sleep\par \par Her shortness of breath is multifactorial due to:\par -poor mechanics of breathing \par -out of shape \par - pulmonary disease\par    - ?asthma \par -?cardiac disease (doubtful)\par \par problem 1: asthma (WNL)\par -No Rx indicated \par  - Asthma is believed to be caused by inherited (genetic) and environmental factor, but its exact cause is unknown. Asthma may be triggered by allergens, lung infections, or irritants in the air. Asthma triggers are different for each person.\par \par problem 2: GERD / reflux \par -continue Pantoprazole 40 mg QAM  \par -Add Pepcid 40 mg QHS \par - recommended to limit caffeine intake  \par -Rule of 2s: avoid eating too much, eating too late, eating too spicy, eating two hours before bed.\par -Things to avoid including overeating, spicy foods, tight clothing, eating within three hours of bed, this list is not all inclusive. \par -For treatment of reflux, possible options discussed including diet control, H2 blockers, PPIs, as well as coating motility agents discussed as treatment options. Timing of meals and proximity of last meal to sleep were discussed. If symptoms persist, a formal gastrointestinal evaluation is needed.\par \par Problem 3: poor sleep / fatigue / ?pre-menopausal / No OSAS - ?RLS\par - s/p home sleep study 5/2021\par -Add Mirapex .5 mg QHS \par Sleep apnea is associated with adverse clinical consequences which can affect most organ systems.  Cardiovascular disease risk includes arrhythmias, atrial fibrillation, hypertension, coronary artery disease, and stroke. Metabolic disorders include diabetes type 2, non-alcoholic fatty liver disease. Mood disorder especially depression; and cognitive decline especially in the elderly. Associations with  chronic reflux/Mathew’s esophagus some but not all inclusive. \par -Reasons  include arousal consistent with hypopnea; respiratory events most prominent in REM sleep or supine position; therefore sleep staging and body position are important for accurate diagnosis and estimation of AHI. \par \par Problem 4: abnormal CT - finding c/w benign abnormalities - less likely malignancy \par -  ?chicken pox, ?mycobacterium other than TB/?TB, ?hypersensitivity pneumonitis, ?sarcoidosis - less likely malignancy \par - get blood work: ESR, ACE level, hypersensitivity pneumonitis panel, Quatiferon Gold test \par - s/p CT in 3 months 5/2021, next 5/2021\par -recommended to use the Aerobika device\par - no role for bronchoscopy or any other biopsy unless CT shows any changes \par CAT scans are the only radiological modality to identify abnormalities w/in the lungs with regards to nodules/masses/lymph nodes. Risks, benefits were reviewed in detail. The guidelines for abnormalities include follow up CT scans at various intervals which could range from 6 weeks to 1 year intervals. If there is a change for the worse then consideration for a biopsy will be considered if you are a candidate. Second opinion evaluation with a thoracic surgeon or an interventional radiologist could be offered. \par \par \par problem 5 : cardiac disease\par -recommended to continue to follow up with Cardiologist if needed \par \par problem  6: poor breathing mechanics\par -Proper breathing techniques were reviewed with an emphasis of exhalation. Patient instructed to breath in for 1 second and out for four seconds. Patient was encouraged to not talk while walking. \par \par \par problem 7: health maintenance \par -Covid-19 vaccine hesitant\par -recommended yearly flu shot after October 15\par -recommended strep pneumonia vaccines: Prevnar-13 vaccine, followed by Pneumo vaccine 23 one year following after 65 years old. \par -recommended early intervention for Upper Respiratory Infections (URIs)\par -recommended regular osteoporosis evaluations\par -recommended early dermatological evaluations\par -recommended after the age of 50 to the age of 70, colonoscopy every 5 years\par \par F/U in 6-8 weeks.\par She is encouraged to call with any changes, concerns, or questions\par \par

## 2021-06-02 NOTE — REVIEW OF SYSTEMS
[GERD] : gerd [Negative] : Endocrine [Nasal Congestion] : no nasal congestion [Postnasal Drip] : no postnasal drip [Sinus Problems] : no sinus problems [Cough] : no cough [Wheezing] : no wheezing [Chest Discomfort] : no chest discomfort [Diarrhea] : no diarrhea [Constipation] : no constipation [Dysphagia] : no dysphagia [Dizziness] : no dizziness

## 2021-06-03 DIAGNOSIS — G47.33 OBSTRUCTIVE SLEEP APNEA (ADULT) (PEDIATRIC): ICD-10-CM

## 2021-06-08 ENCOUNTER — NON-APPOINTMENT (OUTPATIENT)
Age: 51
End: 2021-06-08

## 2021-06-15 ENCOUNTER — NON-APPOINTMENT (OUTPATIENT)
Age: 51
End: 2021-06-15

## 2021-06-21 DIAGNOSIS — Z78.0 ASYMPTOMATIC MENOPAUSAL STATE: ICD-10-CM

## 2021-07-01 ENCOUNTER — NON-APPOINTMENT (OUTPATIENT)
Age: 51
End: 2021-07-01

## 2021-07-09 ENCOUNTER — APPOINTMENT (OUTPATIENT)
Dept: OBGYN | Facility: CLINIC | Age: 51
End: 2021-07-09
Payer: COMMERCIAL

## 2021-07-09 ENCOUNTER — ASOB RESULT (OUTPATIENT)
Age: 51
End: 2021-07-09

## 2021-07-09 VITALS — DIASTOLIC BLOOD PRESSURE: 80 MMHG | SYSTOLIC BLOOD PRESSURE: 102 MMHG

## 2021-07-09 DIAGNOSIS — N93.9 ABNORMAL UTERINE AND VAGINAL BLEEDING, UNSPECIFIED: ICD-10-CM

## 2021-07-09 PROCEDURE — 76831 ECHO EXAM UTERUS: CPT

## 2021-07-09 PROCEDURE — 58340 CATHETER FOR HYSTEROGRAPHY: CPT

## 2021-07-20 NOTE — ASU PREOP CHECKLIST - IDENTIFICATION BAND VERIFIED
done Picato Counseling:  I discussed with the patient the risks of Picato including but not limited to erythema, scaling, itching, weeping, crusting, and pain.

## 2021-07-22 ENCOUNTER — APPOINTMENT (OUTPATIENT)
Dept: OBGYN | Facility: CLINIC | Age: 51
End: 2021-07-22

## 2021-07-22 ENCOUNTER — ASOB RESULT (OUTPATIENT)
Age: 51
End: 2021-07-22

## 2021-07-22 DIAGNOSIS — R10.32 LEFT LOWER QUADRANT PAIN: ICD-10-CM

## 2021-07-22 LAB — HCG UR QL: NEGATIVE

## 2021-07-30 ENCOUNTER — APPOINTMENT (OUTPATIENT)
Dept: OBGYN | Facility: CLINIC | Age: 51
End: 2021-07-30

## 2021-08-05 ENCOUNTER — ASOB RESULT (OUTPATIENT)
Age: 51
End: 2021-08-05

## 2021-08-05 ENCOUNTER — APPOINTMENT (OUTPATIENT)
Dept: NEUROLOGY | Facility: CLINIC | Age: 51
End: 2021-08-05
Payer: COMMERCIAL

## 2021-08-05 ENCOUNTER — APPOINTMENT (OUTPATIENT)
Dept: OBGYN | Facility: CLINIC | Age: 51
End: 2021-08-05
Payer: COMMERCIAL

## 2021-08-05 VITALS
HEART RATE: 51 BPM | DIASTOLIC BLOOD PRESSURE: 60 MMHG | WEIGHT: 122 LBS | HEIGHT: 67 IN | BODY MASS INDEX: 19.15 KG/M2 | SYSTOLIC BLOOD PRESSURE: 96 MMHG

## 2021-08-05 DIAGNOSIS — N95.1 MENOPAUSAL AND FEMALE CLIMACTERIC STATES: ICD-10-CM

## 2021-08-05 DIAGNOSIS — N92.0 EXCESSIVE AND FREQUENT MENSTRUATION WITH REGULAR CYCLE: ICD-10-CM

## 2021-08-05 DIAGNOSIS — G43.829 MENSTRUAL MIGRAINE, NOT INTRACTABLE, W/OUT STATUS MIGRAINOSUS: ICD-10-CM

## 2021-08-05 LAB — HCG UR QL: NEGATIVE

## 2021-08-05 PROCEDURE — 99203 OFFICE O/P NEW LOW 30 MIN: CPT

## 2021-08-05 PROCEDURE — 76831 ECHO EXAM UTERUS: CPT

## 2021-08-05 PROCEDURE — 58340 CATHETER FOR HYSTEROGRAPHY: CPT

## 2021-08-05 RX ORDER — PRAMIPEXOLE DIHYDROCHLORIDE 0.5 MG/1
0.5 TABLET ORAL
Qty: 30 | Refills: 5 | Status: DISCONTINUED | COMMUNITY
Start: 2021-06-02 | End: 2021-08-05

## 2021-08-05 RX ORDER — SUMATRIPTAN 50 MG/1
50 TABLET, FILM COATED ORAL
Qty: 9 | Refills: 3 | Status: ACTIVE | COMMUNITY
Start: 2021-08-05 | End: 1900-01-01

## 2021-08-05 RX ORDER — SENNOSIDES 50; 8.6 MG/1; MG/1
200 TABLET ORAL
Qty: 100 | Refills: 1 | Status: ACTIVE | COMMUNITY
Start: 2021-08-05

## 2021-08-05 NOTE — ASSESSMENT
[FreeTextEntry1] : Reviewed migraine triggers and avoidance.  Start coenzyme every 10 200 mg daily for migraine prophylaxis.  At onset of headache take sumatriptan and 50 mg tablet.  Keep headache diary and return for follow-up in 3 months.

## 2021-08-05 NOTE — CONSULT LETTER
[Dear  ___] : Dear  [unfilled], [Consult Letter:] : I had the pleasure of evaluating your patient, [unfilled]. [Please see my note below.] : Please see my note below. [Consult Closing:] : Thank you very much for allowing me to participate in the care of this patient.  If you have any questions, please do not hesitate to contact me. [Sincerely,] : Sincerely, [FreeTextEntry2] : Miriam Garcia MD\par [address] [place] [address]\[place] [address]

## 2021-08-05 NOTE — HISTORY OF PRESENT ILLNESS
[FreeTextEntry1] : 51-year-old woman complaining of left-sided throbbing head pains that occur perimenstrually for the last 10 years.  Recently the headaches have become more frequent.   Menses have been irregular and she is perimenopausal.

## 2021-08-19 DIAGNOSIS — Z00.00 ENCOUNTER FOR GENERAL ADULT MEDICAL EXAMINATION W/OUT ABNORMAL FINDINGS: ICD-10-CM

## 2021-08-31 ENCOUNTER — APPOINTMENT (OUTPATIENT)
Dept: ORTHOPEDIC SURGERY | Facility: CLINIC | Age: 51
End: 2021-08-31

## 2021-09-09 ENCOUNTER — NON-APPOINTMENT (OUTPATIENT)
Age: 51
End: 2021-09-09

## 2021-10-19 ENCOUNTER — APPOINTMENT (OUTPATIENT)
Dept: ORTHOPEDIC SURGERY | Facility: CLINIC | Age: 51
End: 2021-10-19
Payer: COMMERCIAL

## 2021-10-19 DIAGNOSIS — M25.562 PAIN IN RIGHT KNEE: ICD-10-CM

## 2021-10-19 DIAGNOSIS — G89.29 PAIN IN RIGHT KNEE: ICD-10-CM

## 2021-10-19 DIAGNOSIS — M25.561 PAIN IN RIGHT KNEE: ICD-10-CM

## 2021-10-19 PROCEDURE — 73564 X-RAY EXAM KNEE 4 OR MORE: CPT | Mod: 50

## 2021-10-19 PROCEDURE — 99213 OFFICE O/P EST LOW 20 MIN: CPT

## 2021-10-19 NOTE — PHYSICAL EXAM
[de-identified] : Patient is well nourished, well-developed, in no acute distress, with appropriate mood and affect. The patient is oriented to time, place, and person. Respirations are even and unlabored. Gait evaluation does not reveal a limp. There is no inguinal adenopathy. The right limb is well-perfused and showed 2+ dp/pt pulses, without skin lesions, shows a grossly normal motor and sensory examination. Right knee motion is painless and the knee moves from 0 to 135 degrees. The knee is stable within that range-of-motion to AP and ML stress with a 1A Lachman, negative anterior or posterior drawer and no instability to varus or valgus stress. The alignment of the knee is 5 degrees varus. No effusion or crepitus is noted. No tenderness to palpation about the medial or lateral joint line, medial or lateral tibial plateau, medial or lateral femoral condyle, medial or lateral patellar facets, superior or inferior pole of the patella. Anne's is negative. Muscle strength is normal. Pedal pulses are palpable. Hip examination was negative. The left limb is well-perfused and showed 2+ dp/pt pulses, without skin lesions, shows a grossly normal motor and sensory examination. Left knee motion is painless and the knee moves from 0 to 135 degrees. The knee is stable within that range-of-motion to AP and ML stress with a 1A Lachman, negative anterior or posterior drawer and no instability to varus or valgus stress. The alignment of the knee is 5 degrees varus. No effusion or crepitus is noted. No tenderness to palpation about the medial or lateral joint line, medial or lateral tibial plateau, medial or lateral femoral condyle, medial or lateral patellar facets, superior or inferior pole of the patella. Anne's is negative. Muscle strength is normal. Pedal pulses are palpable. Hip examination was negative. [de-identified] : Long standing  knee, AP knee, lateral knee, and patellar views of the bilat  knee were ordered and taken in the office and do not show any arthritic changes or loss of joint space.

## 2021-10-19 NOTE — HISTORY OF PRESENT ILLNESS
[de-identified] : This is a very nice  51 year old  female experiencing worsening pain in both knees which is mild  in intensity and has been going on for at least 3 months now. The pain does not  limit activities of daily living. Walking tolerance is not reduced. She can't take NSAIDs due to GI upset. She denies injury. The patient denies any radiation of the pain to the feet and it is not associated with numbness, tingling, or weakness.  The knees are not catching clicking locking or giving way.

## 2022-01-12 ENCOUNTER — APPOINTMENT (OUTPATIENT)
Dept: PULMONOLOGY | Facility: CLINIC | Age: 52
End: 2022-01-12
Payer: COMMERCIAL

## 2022-01-12 VITALS
RESPIRATION RATE: 16 BRPM | HEIGHT: 67 IN | HEART RATE: 58 BPM | SYSTOLIC BLOOD PRESSURE: 92 MMHG | WEIGHT: 114 LBS | BODY MASS INDEX: 17.89 KG/M2 | OXYGEN SATURATION: 98 % | TEMPERATURE: 97 F | DIASTOLIC BLOOD PRESSURE: 56 MMHG

## 2022-01-12 PROCEDURE — 99214 OFFICE O/P EST MOD 30 MIN: CPT | Mod: 25

## 2022-01-12 PROCEDURE — 71046 X-RAY EXAM CHEST 2 VIEWS: CPT

## 2022-01-12 PROCEDURE — 95012 NITRIC OXIDE EXP GAS DETER: CPT

## 2022-01-12 PROCEDURE — 94010 BREATHING CAPACITY TEST: CPT

## 2022-01-12 RX ORDER — PREDNISONE 10 MG/1
10 TABLET ORAL
Qty: 50 | Refills: 0 | Status: ACTIVE | COMMUNITY
Start: 2022-01-12 | End: 1900-01-01

## 2022-01-12 RX ORDER — BUDESONIDE AND FORMOTEROL FUMARATE DIHYDRATE 160; 4.5 UG/1; UG/1
160-4.5 AEROSOL RESPIRATORY (INHALATION) TWICE DAILY
Qty: 1 | Refills: 5 | Status: ACTIVE | COMMUNITY
Start: 2022-01-12 | End: 1900-01-01

## 2022-01-12 NOTE — HISTORY OF PRESENT ILLNESS
[TextBox_4] : Ms. MENESES is a 51 year old female presenting to the office today for a follow up pulmonary evaluation. Her chief complaint is poor sleep\par -She is s/p covid 19 2 weeks ago (positive 12/27/2021)\par -She did not cough with covid 19 infection, however has developed a cough post covid worsened in the supine position\par -She notes mucus stuck in her throat when waking up in the morning \par -She has lost weight with covid 19 infection \par -She notes poor sleep since covid infection \par -her vision is declining however is overall controlled \par -she notes muscle aches and pains - resolved since resolution of infection \par -she denies grinding and clenching her teeth \par -she normally sleeps on her side \par -she has not used any medication to alleviate her cough\par -\par \par - patient denies any headaches, nausea, vomiting, fever, chills, sweats, chest pain, chest pressure, palpitations, coughing, wheezing, fatigue, diarrhea, constipation, dysphagia, myalgias, dizziness, leg swelling, leg pain, itchy eyes, itchy ears, heartburn, reflux or sour taste in the mouth

## 2022-01-12 NOTE — ADDENDUM
[FreeTextEntry1] : Documented by Jim Tatum acting as a scribe for Dr. Dimitri Gale on (01/12/2022).\par \par All medical record entries made by the Scribe were at my, Dr. Dimitri Gale's, direction and personally dictated by me on (01/12/2022). I have reviewed the chart and agree that the record accurately reflects my personal performance of the history, physical exam, assessment and plan. I have also personally directed, reviewed, and agree with the discharge instructions.\par

## 2022-01-12 NOTE — PROCEDURE
[FreeTextEntry1] : CT (MAY.28.2021) IMPRESSION:  a few areas of minor airway impaction, one new in the right middle lobe. No suspicious features. A few other tiny upper lung zone nodular densities are without substantial change and presumably airways related, best seen maximum intensity projection series 8. No concerning nodule or dense consolidation. No lymphadenopathy. \par \par CXR revealed a normal sized heart; there was no evidence of infiltrate or effusion -- A normal appearing chest radiograph\par \par Feno was 12; a normal value being less than 25. Fractional exhaled nitric oxide (FENO) is regarded as a simple, noninvasive method for assessing eosinophilic airway inflammation. Produced by a variety of cells within the lung, nitric oxide (NO) concentrations are generally low in healthy individuals. However, high concentrations of NO appear to be involved in nonspecific host defense mechanisms and chronic inflammatory  diseases such as asthma. The American Thoracic Society (ATS) therefore recommended using FENO to aid in the diagnosis and monitoring of eosinophilic airway inflammation and asthma, and for identifying steroid responsive individuals whose chronic respiratory symptoms may be caused by airway inflammation  \par \par Full PFT revealed normal flows, with a FEV1 of 2.92L, which is 97% of predicted, normal lung volumes, and a diffusion of 17.4, which is 87% of predicted, with a normal flow volume loop\par \par -Images and procedures reviewed in detail and discussed with patient.

## 2022-01-12 NOTE — ASSESSMENT
[FreeTextEntry1] : Ms. MENESES  is a 51 year old female with a history of originally from [place], likely remote chicken pox as child, arthritic symptoms, GERD, poor sleep, benign breast lump, nonsmoker, scoliosis who now comes to the office for a follow up pulmonary evaluation for abnormal CT / SOB - poor sleep - covid 19 12/2021-1/2022 - symptomatic \par \par Her shortness of breath is multifactorial due to:\par -poor mechanics of breathing \par -out of shape \par - pulmonary disease\par    - ?asthma \par -?cardiac disease (doubtful)\par \par Problem 1a: covid 19 infection 12/2021 \par -completed quarantine\par -add Symbicort 160 2 inhalations BID \par -Recommended throat Coat Tea \par -Add Prednisone 20mg for 7 days then 10mg for 7 days \par -Information sheet given to the patient to be reviewed, this medication is never to be used without consulting the prescribing physician. Proper dietary restraint is necessary specifically salt containing foods, if any reaction may occur should be reported. \par - Inhaler technique reviewed as well as oral hygiene technique reviewed with patient. Avoidance of cold air, extremes of temperature, rescue inhaler should be used before exercise. Order of medication reviewed with patient. Recommended use of a cool mist humidifier in the bedroom. \par \par \par problem 1: asthma (WNL)\par -No Rx indicated \par  - Asthma is believed to be caused by inherited (genetic) and environmental factor, but its exact cause is unknown. Asthma may be triggered by allergens, lung infections, or irritants in the air. Asthma triggers are different for each person.\par \par problem 2: GERD / reflux \par -continue Pantoprazole 40 mg QAM  \par -continue Pepcid 40 mg QHS \par - recommended to limit caffeine intake  \par -Rule of 2s: avoid eating too much, eating too late, eating too spicy, eating two hours before bed.\par -Things to avoid including overeating, spicy foods, tight clothing, eating within three hours of bed, this list is not all inclusive. \par -For treatment of reflux, possible options discussed including diet control, H2 blockers, PPIs, as well as coating motility agents discussed as treatment options. Timing of meals and proximity of last meal to sleep were discussed. If symptoms persist, a formal gastrointestinal evaluation is needed.\par \par Problem 3: poor sleep / fatigue / ?pre-menopausal / No OSAS - ?RLS\par - s/p home sleep study 5/2021\par -Add Mirapex .5 mg QHS \par Sleep apnea is associated with adverse clinical consequences which can affect most organ systems.  Cardiovascular disease risk includes arrhythmias, atrial fibrillation, hypertension, coronary artery disease, and stroke. Metabolic disorders include diabetes type 2, non-alcoholic fatty liver disease. Mood disorder especially depression; and cognitive decline especially in the elderly. Associations with  chronic reflux/Mathew’s esophagus some but not all inclusive. \par -Reasons  include arousal consistent with hypopnea; respiratory events most prominent in REM sleep or supine position; therefore sleep staging and body position are important for accurate diagnosis and estimation of AHI. \par \par Problem 4: abnormal CT - finding c/w benign abnormalities - less likely malignancy \par -  ?chicken pox, ?mycobacterium other than TB/?TB, ?hypersensitivity pneumonitis, ?sarcoidosis - less likely malignancy \par - get blood work: ESR, ACE level, hypersensitivity pneumonitis panel, Quatiferon Gold test \par - s/p CT in 3 months 5/2021, next 6/2022\par -recommended to use the Aerobika device\par - no role for bronchoscopy or any other biopsy unless CT shows any changes \par CAT scans are the only radiological modality to identify abnormalities w/in the lungs with regards to nodules/masses/lymph nodes. Risks, benefits were reviewed in detail. The guidelines for abnormalities include follow up CT scans at various intervals which could range from 6 weeks to 1 year intervals. If there is a change for the worse then consideration for a biopsy will be considered if you are a candidate. Second opinion evaluation with a thoracic surgeon or an interventional radiologist could be offered. \par \par \par problem 5 : cardiac disease\par -recommended to continue to follow up with Cardiologist if needed \par \par problem  6: poor breathing mechanics\par -Recommended Wim Hof and Buteyko breathing techniques \par -Proper breathing techniques were reviewed with an emphasis of exhalation. Patient instructed to breath in for 1 second and out for four seconds. Patient was encouraged to not talk while walking. \par \par \par problem 7: health maintenance \par -Covid-19 vaccine hesitant\par -recommended yearly flu shot after October 15\par -recommended strep pneumonia vaccines: Prevnar-13 vaccine, followed by Pneumo vaccine 23 one year following after 65 years old. \par -recommended early intervention for Upper Respiratory Infections (URIs)\par -recommended regular osteoporosis evaluations\par -recommended early dermatological evaluations\par -recommended after the age of 50 to the age of 70, colonoscopy every 5 years\par \par F/U in 6-8 weeks.\par She is encouraged to call with any changes, concerns, or questions\par \par

## 2022-01-18 ENCOUNTER — RESULT REVIEW (OUTPATIENT)
Age: 52
End: 2022-01-18

## 2022-01-18 ENCOUNTER — APPOINTMENT (OUTPATIENT)
Dept: MRI IMAGING | Facility: CLINIC | Age: 52
End: 2022-01-18
Payer: COMMERCIAL

## 2022-01-18 ENCOUNTER — OUTPATIENT (OUTPATIENT)
Dept: OUTPATIENT SERVICES | Facility: HOSPITAL | Age: 52
LOS: 1 days | End: 2022-01-18
Payer: COMMERCIAL

## 2022-01-18 DIAGNOSIS — Z00.8 ENCOUNTER FOR OTHER GENERAL EXAMINATION: ICD-10-CM

## 2022-01-18 PROCEDURE — 70551 MRI BRAIN STEM W/O DYE: CPT

## 2022-01-18 PROCEDURE — 70551 MRI BRAIN STEM W/O DYE: CPT | Mod: 26

## 2022-05-16 ENCOUNTER — APPOINTMENT (OUTPATIENT)
Dept: PULMONOLOGY | Facility: CLINIC | Age: 52
End: 2022-05-16

## 2022-08-05 ENCOUNTER — APPOINTMENT (OUTPATIENT)
Dept: PULMONOLOGY | Facility: CLINIC | Age: 52
End: 2022-08-05

## 2022-08-09 ENCOUNTER — APPOINTMENT (OUTPATIENT)
Dept: OBGYN | Facility: CLINIC | Age: 52
End: 2022-08-09

## 2022-08-09 VITALS
WEIGHT: 115 LBS | SYSTOLIC BLOOD PRESSURE: 103 MMHG | BODY MASS INDEX: 18.05 KG/M2 | DIASTOLIC BLOOD PRESSURE: 60 MMHG | HEIGHT: 67 IN

## 2022-08-09 DIAGNOSIS — M81.0 AGE-RELATED OSTEOPOROSIS W/OUT CURRENT PATHOLOGICAL FRACTURE: ICD-10-CM

## 2022-08-09 DIAGNOSIS — Z01.419 ENCOUNTER FOR GYNECOLOGICAL EXAMINATION (GENERAL) (ROUTINE) W/OUT ABNORMAL FINDINGS: ICD-10-CM

## 2022-08-09 PROCEDURE — 82270 OCCULT BLOOD FECES: CPT

## 2022-08-09 PROCEDURE — 99396 PREV VISIT EST AGE 40-64: CPT

## 2022-08-10 LAB — HPV HIGH+LOW RISK DNA PNL CVX: NOT DETECTED

## 2022-08-13 LAB — CYTOLOGY CVX/VAG DOC THIN PREP: NORMAL

## 2022-10-03 ENCOUNTER — APPOINTMENT (OUTPATIENT)
Dept: OBGYN | Facility: CLINIC | Age: 52
End: 2022-10-03
Payer: COMMERCIAL

## 2022-10-03 ENCOUNTER — ASOB RESULT (OUTPATIENT)
Age: 52
End: 2022-10-03

## 2022-10-03 PROCEDURE — 76830 TRANSVAGINAL US NON-OB: CPT

## 2022-10-17 ENCOUNTER — NON-APPOINTMENT (OUTPATIENT)
Age: 52
End: 2022-10-17

## 2022-11-04 ENCOUNTER — APPOINTMENT (OUTPATIENT)
Dept: PULMONOLOGY | Facility: CLINIC | Age: 52
End: 2022-11-04

## 2022-12-03 ENCOUNTER — NON-APPOINTMENT (OUTPATIENT)
Age: 52
End: 2022-12-03

## 2022-12-05 ENCOUNTER — APPOINTMENT (OUTPATIENT)
Dept: PLASTIC SURGERY | Facility: CLINIC | Age: 52
End: 2022-12-05

## 2022-12-05 VITALS
OXYGEN SATURATION: 99 % | HEIGHT: 69 IN | SYSTOLIC BLOOD PRESSURE: 83 MMHG | BODY MASS INDEX: 17.77 KG/M2 | DIASTOLIC BLOOD PRESSURE: 52 MMHG | TEMPERATURE: 97.2 F | WEIGHT: 120 LBS | HEART RATE: 45 BPM

## 2022-12-05 PROCEDURE — 99213 OFFICE O/P EST LOW 20 MIN: CPT

## 2022-12-05 NOTE — PHYSICAL EXAM
[Normocephalic] : normocephalic [Atraumatic] : atraumatic [EOMI] : extra ocular movement intact [Sclera nonicteric] : sclera nonicteric [Supple] : supple [No Supraclavicular Adenopathy] : no supraclavicular adenopathy [No Cervical Adenopathy] : no cervical adenopathy [No Thyromegaly] : no thyromegaly [Examined in the supine and seated position] : examined in the supine and seated position [No dominant masses] : no dominant masses in right breast  [No Nipple Retraction] : no left nipple retraction [No Nipple Discharge] : no left nipple discharge [No Axillary Lymphadenopathy] : no left axillary lymphadenopathy [No Edema] : no edema [No Rashes] : no rashes [No Ulceration] : no ulceration [de-identified] : Well-healed upper PA incision. [de-identified] : Known palpable mass 6:00 (N4cm) 5 x 6 mm without change, benign-appearing c/w fat lobule

## 2022-12-05 NOTE — HISTORY OF PRESENT ILLNESS
[FreeTextEntry1] : Patient is a 52 year old female here today for routine breast examination. \par No family history of breast cancer. \par She has a history of benign right breast surgical excision (8/2018). Pathology showed atypical findings, papillary lesion. \par 7/5/2022 Bilateral mammogram: No suspicious masses, calcifications, or other findings are seen. Stable postsurgical changes are noted within the right superior lateral breast at anterior depth at the site of excisional biopsy. BI-RADS 2. \par Bilateral breast ultrasound: No suspicious abnormalities were seen sonographically. BI-RADS 1. \par A 1 year screening mammogram and ultrasound of both breasts is recommended. BI-RADS 2. \par Up to date with MDs\par She still has breast tenderness.

## 2022-12-05 NOTE — ASSESSMENT
[FreeTextEntry1] : H/O benign right breast surgical excision (8/2018)\par Left stable 6:00 palpable lump\par \par 1. Annual bilateral mammogram and breast ultrasound due 7/2023\par 2. Follow up office visit due in 1 year\par 3. Advised monthly self breast examinations and advised her to contact me if she has any concerns. \par \par Patient seen and examined with my PA Mikayla mckeon

## 2022-12-05 NOTE — CONSULT LETTER
[Dear  ___] : Dear  [unfilled], [Courtesy Letter:] : I had the pleasure of seeing your patient, [unfilled], in my office today. [Please see my note below.] : Please see my note below. [Sincerely,] : Sincerely, [DrYasmine  ___] : Dr. ZULETA [FreeTextEntry3] : Susan M. Palleschi, MD, FACS\par Division of Breast Surgery\par Director, Breast Surgery\par [facility]\par [address] [place] [address]\par [address]\[place] [address]\par (Phone) [phone]\par (Fax) [phone]

## 2022-12-06 ENCOUNTER — OUTPATIENT (OUTPATIENT)
Dept: OUTPATIENT SERVICES | Facility: HOSPITAL | Age: 52
LOS: 1 days | End: 2022-12-06
Payer: COMMERCIAL

## 2022-12-06 ENCOUNTER — APPOINTMENT (OUTPATIENT)
Dept: CT IMAGING | Facility: CLINIC | Age: 52
End: 2022-12-06

## 2022-12-06 ENCOUNTER — RESULT REVIEW (OUTPATIENT)
Age: 52
End: 2022-12-06

## 2022-12-06 DIAGNOSIS — Z00.8 ENCOUNTER FOR OTHER GENERAL EXAMINATION: ICD-10-CM

## 2022-12-06 DIAGNOSIS — R91.8 OTHER NONSPECIFIC ABNORMAL FINDING OF LUNG FIELD: ICD-10-CM

## 2022-12-06 PROCEDURE — 71250 CT THORAX DX C-: CPT | Mod: 26

## 2022-12-06 PROCEDURE — 71250 CT THORAX DX C-: CPT

## 2022-12-21 ENCOUNTER — APPOINTMENT (OUTPATIENT)
Dept: PULMONOLOGY | Facility: CLINIC | Age: 52
End: 2022-12-21

## 2022-12-21 ENCOUNTER — NON-APPOINTMENT (OUTPATIENT)
Age: 52
End: 2022-12-21

## 2022-12-21 VITALS
WEIGHT: 120 LBS | OXYGEN SATURATION: 96 % | BODY MASS INDEX: 17.77 KG/M2 | TEMPERATURE: 97.1 F | SYSTOLIC BLOOD PRESSURE: 96 MMHG | HEIGHT: 69 IN | DIASTOLIC BLOOD PRESSURE: 68 MMHG | RESPIRATION RATE: 16 BRPM | HEART RATE: 55 BPM

## 2022-12-21 PROCEDURE — 95012 NITRIC OXIDE EXP GAS DETER: CPT

## 2022-12-21 PROCEDURE — 99214 OFFICE O/P EST MOD 30 MIN: CPT | Mod: 25

## 2022-12-21 PROCEDURE — 94010 BREATHING CAPACITY TEST: CPT

## 2022-12-21 NOTE — ASSESSMENT
[FreeTextEntry1] : Ms. MENESES  is a 52 year old female with a history of originally from [place], likely remote chicken pox as child, arthritic symptoms, GERD, poor sleep, benign breast lump, nonsmoker, scoliosis who now comes to the office for a follow up pulmonary evaluation for abnormal CT / SOB - poor sleep - covid 19 12/2021-1/2022 - resolved - #1 issue is osteoperosis\par Her shortness of breath is multifactorial due to:\par -poor mechanics of breathing \par -out of shape \par - pulmonary disease\par    - ?asthma \par -?cardiac disease (doubtful)\par \par Problem 1a: covid 19 infection 12/2021 - resolved\par -completed quarantine\par -s/p Symbicort 160 2 inhalations BID \par -Recommended throat Coat Tea \par -S/P Prednisone 20mg for 7 days then 10mg for 7 days \par -Information sheet given to the patient to be reviewed, this medication is never to be used without consulting the prescribing physician. Proper dietary restraint is necessary specifically salt containing foods, if any reaction may occur should be reported. \par - Inhaler technique reviewed as well as oral hygiene technique reviewed with patient. Avoidance of cold air, extremes of temperature, rescue inhaler should be used before exercise. Order of medication reviewed with patient. Recommended use of a cool mist humidifier in the bedroom. \par \par \par problem 1: asthma (WNL)\par -No Rx indicated \par  - Asthma is believed to be caused by inherited (genetic) and environmental factor, but its exact cause is unknown. Asthma may be triggered by allergens, lung infections, or irritants in the air. Asthma triggers are different for each person.\par \par problem 2: GERD / reflux (quiet)\par - d/c Pantoprazole 40 mg QAM  \par -d/c Pepcid 40 mg QHS \par - recommended to limit caffeine intake  \par -Rule of 2s: avoid eating too much, eating too late, eating too spicy, eating two hours before bed.\par -Things to avoid including overeating, spicy foods, tight clothing, eating within three hours of bed, this list is not all inclusive. \par -For treatment of reflux, possible options discussed including diet control, H2 blockers, PPIs, as well as coating motility agents discussed as treatment options. Timing of meals and proximity of last meal to sleep were discussed. If symptoms persist, a formal gastrointestinal evaluation is needed.\par \par Problem 3: poor sleep / fatigue / ?pre-menopausal / No OSAS - (+) RLS (low iron)\par -add myocalm pm\par - s/p home sleep study 5/2021\par -Add Mirapex .5 mg QHS \par Sleep apnea is associated with adverse clinical consequences which can affect most organ systems.  Cardiovascular disease risk includes arrhythmias, atrial fibrillation, hypertension, coronary artery disease, and stroke. Metabolic disorders include diabetes type 2, non-alcoholic fatty liver disease. Mood disorder especially depression; and cognitive decline especially in the elderly. Associations with  chronic reflux/Mathew’s esophagus some but not all inclusive. \par -Reasons  include arousal consistent with hypopnea; respiratory events most prominent in REM sleep or supine position; therefore sleep staging and body position are important for accurate diagnosis and estimation of AHI. \par Restless Legs Syndrome (RLS), also known as Almaraz-Ekbom Disease, is a common sleep related movement disorder. About 1 in 10 adults in the [place] have problems from restless leg syndrome. It also can be seen in about 2% of children. Women are twice as likely as men to have RLS. People with RLS will have symptoms most often during times when they are less active, especially at bedtime. RLS most often causes an overwhelming urge to move your legs and sometimes other parts of your body. This urge is associated with\par unpleasant sensations in different parts of the body. The symptoms can be mild to severe and can affect your ability to go to sleep and stay asleep. People with RLS often sleep less at night and feel more tired during the day. \par \par Problem 4: abnormal CT - finding c/w benign abnormalities - less likely malignancy \par -  ?chicken pox, ?mycobacterium other than TB/?TB, ?hypersensitivity pneumonitis, ?sarcoidosis - less likely malignancy \par - get blood work: ESR, ACE level, hypersensitivity pneumonitis panel, Quatiferon Gold test \par - s/p CT in 3 months 5/2021, 6/2022 next 12/2023\par -recommended to use the [employer] device\par - no role for bronchoscopy or any other biopsy unless CT shows any changes \par CAT scans are the only radiological modality to identify abnormalities w/in the lungs with regards to nodules/masses/lymph nodes. Risks, benefits were reviewed in detail. The guidelines for abnormalities include follow up CT scans at various intervals which could range from 6 weeks to 1 year intervals. If there is a change for the worse then consideration for a biopsy will be considered if you are a candidate. Second opinion evaluation with a thoracic surgeon or an interventional radiologist could be offered. \par \par \par problem 5 : cardiac disease\par -recommended to continue to follow up with Cardiologist if needed (Somek)\par \par problem  6: poor breathing mechanics\par -Recommended Wim Hof and Buteyko breathing techniques \par -Proper breathing techniques were reviewed with an emphasis of exhalation. Patient instructed to breath in for 1 second and out for four seconds. Patient was encouraged to not talk while walking. \par \par \par problem 7: health maintenance \par - recommended osteo strong for osteoporosis \par -recommended [employer] Stretches on YouTube \par -recommended cordless jumprope \par -Covid-19 vaccine hesitant\par -recommended yearly flu shot after October 15\par -recommended strep pneumonia vaccines: Prevnar-13 vaccine, followed by Pneumo vaccine 23 one year following after 65 years old. \par -recommended early intervention for Upper Respiratory Infections (URIs)\par -recommended regular osteoporosis evaluations\par -recommended early dermatological evaluations\par -recommended after the age of 50 to the age of 70, colonoscopy every 5 years\par \par F/U in 6-8 weeks.\par She is encouraged to call with any changes, concerns, or questions\par \par

## 2022-12-21 NOTE — PROCEDURE
[FreeTextEntry1] : Sleep study (5/26/2021) revealed no sleep apnea with an AHI/KARLEY of 0.7, and a low oxygen saturation of 91 %\par \par Chest CT (12/06/2022) revealed 4 mm right lower lobe nodule (series 3 image 83). Recommend provision of prior chest CTs for comparison.\par \par PFT reveals normal flows, with an FEV1 of 2.80 L, which is 86 % of predicted, with a normal flow volume loop. \par \par FENO was 20 ; a normal value being less than 25\par Fractional exhaled nitric oxide (FENO) is regarded as a simple, noninvasive method for assessing eosinophilic airway inflammation. Produced by a variety of cells within the lung, nitric oxide (NO) concentrations are generally low in healthy individuals. However, high concentrations of NO appear to be involved in nonspecific host defense mechanisms and chronic inflammatory diseases such as asthma. The American Thoracic Society (ATS) therefore has recommended using FENO to aid in the diagnosis and monitoring of eosinophilic airway inflammation and asthma, and for identifying steroid responsive individuals whose chronic respiratory symptoms may be caused by airway inflammation.

## 2022-12-21 NOTE — HISTORY OF PRESENT ILLNESS
[TextBox_4] : Ms. MENESES is a 52 year old female presenting to the office today for a follow up pulmonary evaluation. Her chief complaint is \par \par -She notes energy levels are good but fatigues easily\par -she notes saw cardiologist recently \par -she notes she walks a lot \par -she notes significant bone density loss\par -she notes poor quality of sleep \par -she notes sleeping for 7 hours\par -she notes interrupted sleep and difficulty falling back asleep after interruptions\par -she notes she feels chills \par \par patient denies any headaches, nausea, vomiting, fever, sweats, chest pain, chest pressure, palpitations, coughing, wheezing, fatigue, diarrhea, constipation, dysphagia, myalgias, dizziness, leg swelling, leg pain, itchy eyes, itchy ears, heartburn, reflux or sour taste in the mouth

## 2023-05-02 ENCOUNTER — APPOINTMENT (OUTPATIENT)
Dept: PULMONOLOGY | Facility: CLINIC | Age: 53
End: 2023-05-02
Payer: MEDICARE

## 2023-05-02 ENCOUNTER — NON-APPOINTMENT (OUTPATIENT)
Age: 53
End: 2023-05-02

## 2023-05-02 VITALS
TEMPERATURE: 97.4 F | RESPIRATION RATE: 16 BRPM | SYSTOLIC BLOOD PRESSURE: 110 MMHG | DIASTOLIC BLOOD PRESSURE: 70 MMHG | BODY MASS INDEX: 17.03 KG/M2 | HEART RATE: 50 BPM | OXYGEN SATURATION: 97 % | HEIGHT: 69 IN | WEIGHT: 115 LBS

## 2023-05-02 PROCEDURE — 94010 BREATHING CAPACITY TEST: CPT

## 2023-05-02 PROCEDURE — 99214 OFFICE O/P EST MOD 30 MIN: CPT | Mod: 25

## 2023-05-02 PROCEDURE — 95012 NITRIC OXIDE EXP GAS DETER: CPT

## 2023-05-02 NOTE — HISTORY OF PRESENT ILLNESS
[TextBox_4] : Ms. MENESES is a 53 year old female presenting to the office today for a follow up pulmonary evaluation. Her chief complaint is \par \par -she denies exercising \par -she notes feeling generally well \par -she notes that osteoporosis PT has not been helping her\par -she notes that she cannot fall asleep\par -she notes that she is not sleeping well \par -she denies SOB \par -she notes fatigue\par -she notes reflux\par \par -patient denies any headaches, nausea, vomiting, fever, chills, sweats, chest pain, chest pressure, palpitations, diarrhea, constipation, dysphagia, dizziness, leg swelling, itchy eyes, itchy ears,

## 2023-05-02 NOTE — PROCEDURE
[FreeTextEntry1] : PFT revealed normal flows, with a FEV1 of 2.65L, which is 82% of predicted, with a normal flow volume loop \par \par Feno was 17; a normal value being less than 25. Fractional exhaled nitric oxide (FENO) is regarded as a simple, noninvasive method for assessing eosinophilic airway inflammation. Produced by a variety of cells within the lung, nitric oxide (NO) concentrations are generally low in healthy individuals. However, high concentrations of NO appear to be involved in nonspecific host defense mechanisms and chronic inflammatory  diseases such as asthma. The American Thoracic Society (ATS) therefore recommended using FENO to aid in the diagnosis and monitoring of eosinophilic airway inflammation and asthma, and for identifying steroid responsive individuals whose chronic respiratory symptoms may be caused by airway inflammation

## 2023-05-02 NOTE — ASSESSMENT
[FreeTextEntry1] : Ms. MENESES  is a 53 year old female with a history of originally from [place], likely remote chicken pox as child, arthritic symptoms, GERD, poor sleep, benign breast lump, nonsmoker, scoliosis who now comes to the office for a follow up pulmonary evaluation for abnormal CT / SOB - poor sleep - covid 19 12/2021-1/2022 - resolved - #1 issue is osteoporosis/ fatigue/ reflux\par \par Her shortness of breath is multifactorial due to:\par -poor mechanics of breathing \par -out of shape \par - pulmonary disease\par    - ?asthma \par -?cardiac disease (doubtful)\par \par Problem 1a: covid 19 infection 12/2021 - resolved\par -completed quarantine\par -s/p Symbicort 160 2 inhalations BID \par -Recommended throat Coat Tea \par -S/P Prednisone 20mg for 7 days then 10mg for 7 days \par -Information sheet given to the patient to be reviewed, this medication is never to be used without consulting the prescribing physician. Proper dietary restraint is necessary specifically salt containing foods, if any reaction may occur should be reported. \par - Inhaler technique reviewed as well as oral hygiene technique reviewed with patient. Avoidance of cold air, extremes of temperature, rescue inhaler should be used before exercise. Order of medication reviewed with patient. Recommended use of a cool mist humidifier in the bedroom. \par \par \par problem 1: asthma (WNL)\par -No Rx indicated \par  - Asthma is believed to be caused by inherited (genetic) and environmental factor, but its exact cause is unknown. Asthma may be triggered by allergens, lung infections, or irritants in the air. Asthma triggers are different for each person.\par \par problem 2: GERD / reflux (active)\par - d/c Pantoprazole 40 mg QAM  \par -continue Pepcid 40 mg QHS \par -recommended Digestiv Aide \par - recommended to limit caffeine intake  \par -Rule of 2s: avoid eating too much, eating too late, eating too spicy, eating two hours before bed.\par -Things to avoid including overeating, spicy foods, tight clothing, eating within three hours of bed, this list is not all inclusive. \par -For treatment of reflux, possible options discussed including diet control, H2 blockers, PPIs, as well as coating motility agents discussed as treatment options. Timing of meals and proximity of last meal to sleep were discussed. If symptoms persist, a formal gastrointestinal evaluation is needed.\par \par Problem 3: poor sleep / fatigue / ?pre-menopausal / No OSAS - (+) RLS (low iron)\par -add myocalm pm\par - s/p home sleep study 5/2021\par -Add Mirapex .5 mg QHS \par Sleep apnea is associated with adverse clinical consequences which can affect most organ systems.  Cardiovascular disease risk includes arrhythmias, atrial fibrillation, hypertension, coronary artery disease, and stroke. Metabolic disorders include diabetes type 2, non-alcoholic fatty liver disease. Mood disorder especially depression; and cognitive decline especially in the elderly. Associations with  chronic reflux/Mathew’s esophagus some but not all inclusive. \par -Reasons  include arousal consistent with hypopnea; respiratory events most prominent in REM sleep or supine position; therefore sleep staging and body position are important for accurate diagnosis and estimation of AHI. \par Restless Legs Syndrome (RLS), also known as Almaraz-Ekbom Disease, is a common sleep related movement disorder. About 1 in 10 adults in the [place] have problems from restless leg syndrome. It also can be seen in about 2% of children. Women are twice as likely as men to have RLS. People with RLS will have symptoms most often during times when they are less active, especially at bedtime. RLS most often causes an overwhelming urge to move your legs and sometimes other parts of your body. This urge is associated with\par unpleasant sensations in different parts of the body. The symptoms can be mild to severe and can affect your ability to go to sleep and stay asleep. People with RLS often sleep less at night and feel more tired during the day. \par \par Problem 4: abnormal CT - finding c/w benign abnormalities - less likely malignancy \par -  ?chicken pox, ?mycobacterium other than TB/?TB, ?hypersensitivity pneumonitis, ?sarcoidosis - less likely malignancy \par - get blood work: ESR, ACE level, hypersensitivity pneumonitis panel, Quatiferon Gold test \par - s/p CT in 3 months 5/2021, 6/2022 next 12/2023\par -recommended to use the [employer] device\par - no role for bronchoscopy or any other biopsy unless CT shows any changes \par CAT scans are the only radiological modality to identify abnormalities w/in the lungs with regards to nodules/masses/lymph nodes. Risks, benefits were reviewed in detail. The guidelines for abnormalities include follow up CT scans at various intervals which could range from 6 weeks to 1 year intervals. If there is a change for the worse then consideration for a biopsy will be considered if you are a candidate. Second opinion evaluation with a thoracic surgeon or an interventional radiologist could be offered. \par \par \par problem 5 : cardiac disease\par -recommended to continue to follow up with Cardiologist if needed (Somek)\par \par problem  6: poor breathing mechanics\par -Recommended Wim Hof and Buteyko breathing techniques \par -Proper breathing techniques were reviewed with an emphasis of exhalation. Patient instructed to breath in for 1 second and out for four seconds. Patient was encouraged to not talk while walking. \par \par \par problem 7: health maintenance \par - recommended osteo strong for osteoporosis \par -recommended [employer] Stretches on YouTube \par -recommended cordless jumprope \par -Covid-19 vaccine hesitant\par -recommended yearly flu shot after October 15\par -recommended strep pneumonia vaccines: Prevnar-13 vaccine, followed by Pneumo vaccine 23 one year following after 65 years old. \par -recommended early intervention for Upper Respiratory Infections (URIs)\par -recommended regular osteoporosis evaluations\par -recommended early dermatological evaluations\par -recommended after the age of 50 to the age of 70, colonoscopy every 5 years\par \par F/U in 6-8 weeks.\par She is encouraged to call with any changes, concerns, or questions\par \par

## 2023-05-02 NOTE — ADDENDUM
[FreeTextEntry1] : Documented by Luan Delaney acting as a scribe for Dr. Dimitri Gale on 05/02/2023.\par \par All medical record entries made by the Scribe were at my, Dr. Dimitri Gale's, direction and personally dictated by me on 05/02/2023. I have reviewed the chart and agree that the record accurately reflects my personal performance of the history, physical exam, assessment and plan. I have also personally directed, reviewed, and agree with the discharge instructions.

## 2023-07-10 NOTE — ADDENDUM
[FreeTextEntry1] : Documented by Jim Tatum acting as a scribe for Dr. Dimitri Gale on (01/12/2022).\par \par All medical record entries made by the Scribe were at my, Dr. Dimitri Gale's, direction and personally dictated by me on (01/12/2022). I have reviewed the chart and agree that the record accurately reflects my personal performance of the history, physical exam, assessment and plan. I have also personally directed, reviewed, and agree with the discharge instructions.\par  
done

## 2023-10-27 ENCOUNTER — NON-APPOINTMENT (OUTPATIENT)
Age: 53
End: 2023-10-27

## 2023-11-13 ENCOUNTER — APPOINTMENT (OUTPATIENT)
Dept: PLASTIC SURGERY | Facility: CLINIC | Age: 53
End: 2023-11-13
Payer: SELF-PAY

## 2023-11-13 VITALS
TEMPERATURE: 97.4 F | HEIGHT: 69 IN | OXYGEN SATURATION: 93 % | HEART RATE: 59 BPM | BODY MASS INDEX: 17.77 KG/M2 | DIASTOLIC BLOOD PRESSURE: 54 MMHG | SYSTOLIC BLOOD PRESSURE: 84 MMHG | WEIGHT: 120 LBS

## 2023-11-13 DIAGNOSIS — N63.20 UNSPECIFIED LUMP IN THE LEFT BREAST, UNSPECIFIED QUADRANT: ICD-10-CM

## 2023-11-13 PROCEDURE — 99213 OFFICE O/P EST LOW 20 MIN: CPT

## 2023-12-04 ENCOUNTER — OUTPATIENT (OUTPATIENT)
Dept: OUTPATIENT SERVICES | Facility: HOSPITAL | Age: 53
LOS: 1 days | End: 2023-12-04
Payer: MEDICAID

## 2023-12-04 ENCOUNTER — APPOINTMENT (OUTPATIENT)
Dept: CT IMAGING | Facility: CLINIC | Age: 53
End: 2023-12-04
Payer: MEDICAID

## 2023-12-04 DIAGNOSIS — Z00.8 ENCOUNTER FOR OTHER GENERAL EXAMINATION: ICD-10-CM

## 2023-12-04 DIAGNOSIS — R91.8 OTHER NONSPECIFIC ABNORMAL FINDING OF LUNG FIELD: ICD-10-CM

## 2023-12-04 PROCEDURE — 71250 CT THORAX DX C-: CPT

## 2023-12-04 PROCEDURE — 71250 CT THORAX DX C-: CPT | Mod: 26

## 2023-12-19 ENCOUNTER — APPOINTMENT (OUTPATIENT)
Dept: PULMONOLOGY | Facility: CLINIC | Age: 53
End: 2023-12-19
Payer: MEDICAID

## 2023-12-19 VITALS
HEIGHT: 69 IN | HEART RATE: 50 BPM | SYSTOLIC BLOOD PRESSURE: 96 MMHG | OXYGEN SATURATION: 98 % | DIASTOLIC BLOOD PRESSURE: 58 MMHG | TEMPERATURE: 96.5 F | RESPIRATION RATE: 16 BRPM | BODY MASS INDEX: 17.77 KG/M2 | WEIGHT: 120 LBS

## 2023-12-19 DIAGNOSIS — Z72.820 SLEEP DEPRIVATION: ICD-10-CM

## 2023-12-19 DIAGNOSIS — R06.02 SHORTNESS OF BREATH: ICD-10-CM

## 2023-12-19 DIAGNOSIS — J45.909 UNSPECIFIED ASTHMA, UNCOMPLICATED: ICD-10-CM

## 2023-12-19 DIAGNOSIS — R91.8 OTHER NONSPECIFIC ABNORMAL FINDING OF LUNG FIELD: ICD-10-CM

## 2023-12-19 DIAGNOSIS — K21.9 GASTRO-ESOPHAGEAL REFLUX DISEASE W/OUT ESOPHAGITIS: ICD-10-CM

## 2023-12-19 PROCEDURE — 95012 NITRIC OXIDE EXP GAS DETER: CPT

## 2023-12-19 PROCEDURE — 99214 OFFICE O/P EST MOD 30 MIN: CPT | Mod: 25

## 2023-12-19 NOTE — HISTORY OF PRESENT ILLNESS
[TextBox_4] : Ms. MENESES is a 53 year old female presenting to the office today for a follow up pulmonary evaluation. Her chief complaint is   -she notes feeling generally well  -she denies exercising  -she notes itchy eyes at times -she denies SOB -she notes sour taste in the mouth at times -she notes needing to see an othpthamologist  -she notes reflux which has lessened -she notes wine or poor diet can cause reflux -she denies back and neck pain -she notes difficulty fallin asleep after an interruption   -patient denies any headaches, nausea, vomiting, fever, chills, sweats, chest pain, chest pressure, palpitations, coughing, wheezing, diarrhea, constipation, dysphagia, myalgias, dizziness, itchy ears

## 2023-12-19 NOTE — PHYSICAL EXAM
[No Acute Distress] : no acute distress [Normal Oropharynx] : normal oropharynx [Normal Appearance] : normal appearance [No Neck Mass] : no neck mass [Normal Rate/Rhythm] : normal rate/rhythm [Normal S1, S2] : normal s1, s2 [No Murmurs] : no murmurs [No Resp Distress] : no resp distress [Clear to Auscultation Bilaterally] : clear to auscultation bilaterally [Benign] : benign [Normal Gait] : normal gait [No Clubbing] : no clubbing [No Cyanosis] : no cyanosis [No Edema] : no edema [FROM] : FROM [Normal Color/ Pigmentation] : normal color/ pigmentation [No Focal Deficits] : no focal deficits [Oriented x3] : oriented x3 [Normal Affect] : normal affect [III] : Mallampati Class: III [No Abnormalities] : no abnormalities [Kyphosis] : kyphosis [TextBox_68] : I:E 1:3, clear  [TextBox_80] : mild kyphosis

## 2023-12-19 NOTE — ASSESSMENT
[FreeTextEntry1] : Ms. MENESES is a 53 year old female with a history of originally from [place], likely remote chicken pox as child, arthritic symptoms, GERD, poor sleep, benign breast lump, nonsmoker, scoliosis who now comes to the office for a follow up pulmonary evaluation for abnormal CT / SOB - poor sleep - covid 19 12/2021-1/2022 - resolved - #1 issue is osteoporosis/ fatigue/ reflux (still)  Her shortness of breath is multifactorial due to: -poor mechanics of breathing -out of shape - pulmonary disease  - ?asthma (likely) -?cardiac disease (doubtful)  problem 1: asthma (WNL) -No Rx indicated  - Asthma is believed to be caused by inherited (genetic) and environmental factor, but its exact cause is unknown. Asthma may be triggered by allergens, lung infections, or irritants in the air. Asthma triggers are different for each person.  Problem 1a: covid 19 infection 12/2021 - resolved -completed quarantine -s/p Symbicort 160 2 inhalations BID -Recommended throat Coat Tea -S/P Prednisone 20mg for 7 days then 10mg for 7 days -Information sheet given to the patient to be reviewed, this medication is never to be used without consulting the prescribing physician. Proper dietary restraint is necessary specifically salt containing foods, if any reaction may occur should be reported. - Inhaler technique reviewed as well as oral hygiene technique reviewed with patient. Avoidance of cold air, extremes of temperature, rescue inhaler should be used before exercise. Order of medication reviewed with patient. Recommended use of a cool mist humidifier in the bedroom.   problem 2: GERD / reflux (active) -recommended Reflux Gourmet OTC - d/c Pantoprazole 40 mg QAM -continue Pepcid 40 mg QHS -recommended Digestiv Aide - recommended to limit caffeine intake -Rule of 2s: avoid eating too much, eating too late, eating too spicy, eating two hours before bed. -Things to avoid including overeating, spicy foods, tight clothing, eating within three hours of bed, this list is not all inclusive. -For treatment of reflux, possible options discussed including diet control, H2 blockers, PPIs, as well as coating motility agents discussed as treatment options. Timing of meals and proximity of last meal to sleep were discussed. If symptoms persist, a formal gastrointestinal evaluation is needed.  Problem 3: poor sleep / fatigue / ?pre-menopausal / No OSAS - (+) RLS (low iron) -recommended Neuromag -add myocalm pm - s/p home sleep study 5/2021 -Add Mirapex.5 mg QHS Sleep apnea is associated with adverse clinical consequences which can affect most organ systems. Cardiovascular disease risk includes arrhythmias, atrial fibrillation, hypertension, coronary artery disease, and stroke. Metabolic disorders include diabetes type 2, non-alcoholic fatty liver disease. Mood disorder especially depression; and cognitive decline especially in the elderly. Associations with chronic reflux/Mathew's esophagus some but not all inclusive. -Reasons include arousal consistent with hypopnea; respiratory events most prominent in REM sleep or supine position; therefore sleep staging and body position are important for accurate diagnosis and estimation of AHI. Restless Legs Syndrome (RLS), also known as Almaraz-Ekbom Disease, is a common sleep related movement disorder. About 1 in 10 adults in the [place] have problems from restless leg syndrome. It also can be seen in about 2% of children. Women are twice as likely as men to have RLS. People with RLS will have symptoms most often during times when they are less active, especially at bedtime. RLS most often causes an overwhelming urge to move your legs and sometimes other parts of your body. This urge is associated with unpleasant sensations in different parts of the body. The symptoms can be mild to severe and can affect your ability to go to sleep and stay asleep. People with RLS often sleep less at night and feel more tired during the day.  Problem 4: Abnormal CT - finding c/w benign abnormalities - less likely malignancy - improed - ?chicken pox, ?mycobacterium other than TB/?TB, ?hypersensitivity pneumonitis, ?sarcoidosis - less likely malignancy - get blood work: ESR, ACE level, hypersensitivity pneumonitis panel, Quatiferon Gold test - s/p CT in 3 months 5/2021, 6/2022 next 12/2024 -recommended to use the Aerobika device - no role for bronchoscopy or any other biopsy unless CT shows any changes CAT scans are the only radiological modality to identify abnormalities w/in the lungs with regards to nodules/masses/lymph nodes. Risks, benefits were reviewed in detail. The guidelines for abnormalities include follow up CT scans at various intervals which could range from 6 weeks to 1 year intervals. If there is a change for the worse then consideration for a biopsy will be considered if you are a candidate. Second opinion evaluation with a thoracic surgeon or an interventional radiologist could be offered.  problem 5 : cardiac disease -recommended to continue to follow up with Cardiologist if needed (Denisha)  problem 6: poor breathing mechanics -Recommended Catina Laws and Colin breathing techniques -Proper breathing techniques were reviewed with an emphasis of exhalation. Patient instructed to breath in for 1 second and out for four seconds. Patient was encouraged to not talk while walking.  problem 7: health maintenance - recommended osteo strong for osteoporosis -recommended [employer] Stretches on YouTube -recommended cordless jumprope -Covid-19 vaccine hesitant -recommended yearly flu shot after October 15 2023 (refused) -recommended strep pneumonia vaccines: Prevnar-13 vaccine, followed by Pneumo vaccine 23 one year following after 65 years old. -recommended early intervention for Upper Respiratory Infections (URIs) -recommended regular osteoporosis evaluations -recommended early dermatological evaluations -recommended after the age of 50 to the age of 70, colonoscopy every 5 years  F/U in 3-4 months She is encouraged to call with any changes, concerns, or questions

## 2023-12-19 NOTE — ADDENDUM
[FreeTextEntry1] : Documented by Luan Delaney acting as a scribe for Dr. Dimitri Gale on 12/19/2023.  All medical record entries made by the Scribe were at my, Dr. Dimitri Gale's, direction and personally dictated by me on 12/18/2023. I have reviewed the chart and agree that the record accurately reflects my personal performance of the history, physical exam, assessment and plan. I have also personally directed, reviewed, and agree with the discharge instructions.

## 2023-12-19 NOTE — PROCEDURE
[FreeTextEntry1] : Feno was 20; a normal value being less than 25. The American Thor[employer] Society (ATS) strongly recommends the use of FeNO measurement to aid in the assessment, management, and long-term monitoring of asthma. In their 2011 clinical practice guideline, the ATS emphasizes the importance of using FeNO.   CT Chest (12/4/23) revealed interval resolution of previously described right lower lobe nodule. Scattered nodular and tubular opacities in bilateral lungs, likely mucoid impaction.

## 2023-12-26 ENCOUNTER — APPOINTMENT (OUTPATIENT)
Dept: PULMONOLOGY | Facility: CLINIC | Age: 53
End: 2023-12-26

## 2024-04-15 ENCOUNTER — APPOINTMENT (OUTPATIENT)
Dept: OPHTHALMOLOGY | Facility: CLINIC | Age: 54
End: 2024-04-15

## 2024-04-25 ENCOUNTER — APPOINTMENT (OUTPATIENT)
Dept: OPHTHALMOLOGY | Facility: CLINIC | Age: 54
End: 2024-04-25

## 2024-05-06 ENCOUNTER — APPOINTMENT (OUTPATIENT)
Dept: OPHTHALMOLOGY | Facility: CLINIC | Age: 54
End: 2024-05-06
Payer: MEDICAID

## 2024-05-06 ENCOUNTER — NON-APPOINTMENT (OUTPATIENT)
Age: 54
End: 2024-05-06

## 2024-05-06 PROCEDURE — 92004 COMPRE OPH EXAM NEW PT 1/>: CPT

## 2024-05-08 ENCOUNTER — APPOINTMENT (OUTPATIENT)
Dept: PULMONOLOGY | Facility: CLINIC | Age: 54
End: 2024-05-08

## 2024-06-06 ENCOUNTER — APPOINTMENT (OUTPATIENT)
Dept: OPHTHALMOLOGY | Facility: CLINIC | Age: 54
End: 2024-06-06

## 2024-06-18 DIAGNOSIS — R92.30 DENSE BREASTS, UNSPECIFIED: ICD-10-CM

## 2024-06-18 DIAGNOSIS — Z12.39 ENCOUNTER FOR OTHER SCREENING FOR MALIGNANT NEOPLASM OF BREAST: ICD-10-CM

## 2024-11-11 ENCOUNTER — APPOINTMENT (OUTPATIENT)
Dept: OPHTHALMOLOGY | Facility: CLINIC | Age: 54
End: 2024-11-11

## 2024-11-12 ENCOUNTER — APPOINTMENT (OUTPATIENT)
Dept: OBGYN | Facility: CLINIC | Age: 54
End: 2024-11-12

## 2024-11-15 ENCOUNTER — APPOINTMENT (OUTPATIENT)
Dept: PLASTIC SURGERY | Facility: CLINIC | Age: 54
End: 2024-11-15
Payer: SELF-PAY

## 2024-11-15 VITALS
HEIGHT: 69 IN | HEART RATE: 61 BPM | SYSTOLIC BLOOD PRESSURE: 108 MMHG | TEMPERATURE: 96.8 F | WEIGHT: 120 LBS | DIASTOLIC BLOOD PRESSURE: 58 MMHG | BODY MASS INDEX: 17.77 KG/M2 | OXYGEN SATURATION: 98 %

## 2024-11-15 DIAGNOSIS — N63.20 UNSPECIFIED LUMP IN THE LEFT BREAST, UNSPECIFIED QUADRANT: ICD-10-CM

## 2024-11-15 PROCEDURE — 99213 OFFICE O/P EST LOW 20 MIN: CPT

## 2024-12-16 ENCOUNTER — OUTPATIENT (OUTPATIENT)
Dept: OUTPATIENT SERVICES | Facility: HOSPITAL | Age: 54
LOS: 1 days | End: 2024-12-16
Payer: MEDICAID

## 2024-12-16 ENCOUNTER — APPOINTMENT (OUTPATIENT)
Dept: MRI IMAGING | Facility: CLINIC | Age: 54
End: 2024-12-16
Payer: MEDICAID

## 2024-12-16 DIAGNOSIS — M54.16 RADICULOPATHY, LUMBAR REGION: ICD-10-CM

## 2024-12-16 PROCEDURE — 72148 MRI LUMBAR SPINE W/O DYE: CPT

## 2024-12-16 PROCEDURE — 72148 MRI LUMBAR SPINE W/O DYE: CPT | Mod: 26

## 2024-12-18 ENCOUNTER — APPOINTMENT (OUTPATIENT)
Dept: CT IMAGING | Facility: CLINIC | Age: 54
End: 2024-12-18
Payer: MEDICAID

## 2024-12-18 ENCOUNTER — OUTPATIENT (OUTPATIENT)
Dept: OUTPATIENT SERVICES | Facility: HOSPITAL | Age: 54
LOS: 1 days | End: 2024-12-18
Payer: MEDICAID

## 2024-12-18 DIAGNOSIS — R91.8 OTHER NONSPECIFIC ABNORMAL FINDING OF LUNG FIELD: ICD-10-CM

## 2024-12-18 PROCEDURE — 71250 CT THORAX DX C-: CPT

## 2024-12-18 PROCEDURE — 71250 CT THORAX DX C-: CPT | Mod: 26

## 2025-02-06 ENCOUNTER — APPOINTMENT (OUTPATIENT)
Dept: PULMONOLOGY | Facility: CLINIC | Age: 55
End: 2025-02-06
Payer: MEDICAID

## 2025-02-06 VITALS
OXYGEN SATURATION: 99 % | RESPIRATION RATE: 16 BRPM | HEART RATE: 60 BPM | HEIGHT: 69 IN | DIASTOLIC BLOOD PRESSURE: 58 MMHG | BODY MASS INDEX: 17.33 KG/M2 | TEMPERATURE: 96.1 F | WEIGHT: 117 LBS | SYSTOLIC BLOOD PRESSURE: 102 MMHG

## 2025-02-06 DIAGNOSIS — Z72.820 SLEEP DEPRIVATION: ICD-10-CM

## 2025-02-06 DIAGNOSIS — R06.02 SHORTNESS OF BREATH: ICD-10-CM

## 2025-02-06 DIAGNOSIS — J45.909 UNSPECIFIED ASTHMA, UNCOMPLICATED: ICD-10-CM

## 2025-02-06 DIAGNOSIS — K21.9 GASTRO-ESOPHAGEAL REFLUX DISEASE W/OUT ESOPHAGITIS: ICD-10-CM

## 2025-02-06 DIAGNOSIS — R91.8 OTHER NONSPECIFIC ABNORMAL FINDING OF LUNG FIELD: ICD-10-CM

## 2025-02-06 PROCEDURE — 95012 NITRIC OXIDE EXP GAS DETER: CPT

## 2025-02-06 PROCEDURE — 94729 DIFFUSING CAPACITY: CPT

## 2025-02-06 PROCEDURE — 94010 BREATHING CAPACITY TEST: CPT

## 2025-02-06 PROCEDURE — 99214 OFFICE O/P EST MOD 30 MIN: CPT | Mod: 25

## 2025-02-06 PROCEDURE — 94727 GAS DIL/WSHOT DETER LNG VOL: CPT

## 2025-02-11 ENCOUNTER — ASOB RESULT (OUTPATIENT)
Age: 55
End: 2025-02-11

## 2025-02-11 ENCOUNTER — APPOINTMENT (OUTPATIENT)
Dept: OPHTHALMOLOGY | Facility: CLINIC | Age: 55
End: 2025-02-11
Payer: MEDICAID

## 2025-02-11 ENCOUNTER — NON-APPOINTMENT (OUTPATIENT)
Age: 55
End: 2025-02-11

## 2025-02-11 ENCOUNTER — APPOINTMENT (OUTPATIENT)
Dept: OBGYN | Facility: CLINIC | Age: 55
End: 2025-02-11
Payer: SELF-PAY

## 2025-02-11 VITALS
SYSTOLIC BLOOD PRESSURE: 86 MMHG | WEIGHT: 120 LBS | DIASTOLIC BLOOD PRESSURE: 47 MMHG | BODY MASS INDEX: 17.77 KG/M2 | HEIGHT: 69 IN

## 2025-02-11 VITALS — SYSTOLIC BLOOD PRESSURE: 90 MMHG | DIASTOLIC BLOOD PRESSURE: 56 MMHG

## 2025-02-11 DIAGNOSIS — Z01.419 ENCOUNTER FOR GYNECOLOGICAL EXAMINATION (GENERAL) (ROUTINE) W/OUT ABNORMAL FINDINGS: ICD-10-CM

## 2025-02-11 DIAGNOSIS — R93.89 ABNORMAL FINDINGS ON DIAGNOSTIC IMAGING OF OTHER SPECIFIED BODY STRUCTURES: ICD-10-CM

## 2025-02-11 PROCEDURE — 58100 BIOPSY OF UTERUS LINING: CPT | Mod: 59

## 2025-02-11 PROCEDURE — 99396 PREV VISIT EST AGE 40-64: CPT

## 2025-02-11 PROCEDURE — 76830 TRANSVAGINAL US NON-OB: CPT

## 2025-02-11 PROCEDURE — 82270 OCCULT BLOOD FECES: CPT

## 2025-02-11 PROCEDURE — 92014 COMPRE OPH EXAM EST PT 1/>: CPT

## 2025-02-13 LAB — HPV HIGH+LOW RISK DNA PNL CVX: NOT DETECTED

## 2025-02-15 LAB — CYTOLOGY CVX/VAG DOC THIN PREP: NORMAL

## 2025-03-11 ENCOUNTER — APPOINTMENT (OUTPATIENT)
Dept: OBGYN | Facility: CLINIC | Age: 55
End: 2025-03-11

## 2025-03-11 ENCOUNTER — APPOINTMENT (OUTPATIENT)
Dept: OBGYN | Facility: CLINIC | Age: 55
End: 2025-03-11
Payer: SELF-PAY

## 2025-03-11 DIAGNOSIS — Z91.89 OTHER SPECIFIED PERSONAL RISK FACTORS, NOT ELSEWHERE CLASSIFIED: ICD-10-CM

## 2025-03-11 PROCEDURE — 99214 OFFICE O/P EST MOD 30 MIN: CPT

## 2025-03-27 ENCOUNTER — APPOINTMENT (OUTPATIENT)
Dept: ULTRASOUND IMAGING | Facility: HOSPITAL | Age: 55
End: 2025-03-27
Payer: MEDICAID

## 2025-03-27 ENCOUNTER — OUTPATIENT (OUTPATIENT)
Dept: OUTPATIENT SERVICES | Facility: HOSPITAL | Age: 55
LOS: 1 days | End: 2025-03-27
Payer: MEDICAID

## 2025-03-27 ENCOUNTER — NON-APPOINTMENT (OUTPATIENT)
Age: 55
End: 2025-03-27

## 2025-03-27 DIAGNOSIS — R93.89 ABNORMAL FINDINGS ON DIAGNOSTIC IMAGING OF OTHER SPECIFIED BODY STRUCTURES: ICD-10-CM

## 2025-03-27 PROCEDURE — 76831 ECHO EXAM UTERUS: CPT

## 2025-03-27 PROCEDURE — 58340 CATHETER FOR HYSTEROGRAPHY: CPT

## 2025-03-27 PROCEDURE — 76831 ECHO EXAM UTERUS: CPT | Mod: 26

## 2025-04-17 ENCOUNTER — APPOINTMENT (OUTPATIENT)
Dept: OBGYN | Facility: CLINIC | Age: 55
End: 2025-04-17
Payer: SELF-PAY

## 2025-04-17 DIAGNOSIS — Z91.89 OTHER SPECIFIED PERSONAL RISK FACTORS, NOT ELSEWHERE CLASSIFIED: ICD-10-CM

## 2025-04-17 PROCEDURE — 99213 OFFICE O/P EST LOW 20 MIN: CPT

## 2025-07-22 ENCOUNTER — NON-APPOINTMENT (OUTPATIENT)
Age: 55
End: 2025-07-22

## 2025-07-24 ENCOUNTER — APPOINTMENT (OUTPATIENT)
Dept: ORTHOPEDIC SURGERY | Facility: CLINIC | Age: 55
End: 2025-07-24
Payer: MEDICAID

## 2025-07-24 VITALS — HEIGHT: 69 IN | BODY MASS INDEX: 16.88 KG/M2 | WEIGHT: 114 LBS

## 2025-07-24 DIAGNOSIS — M25.562 PAIN IN LEFT KNEE: ICD-10-CM

## 2025-07-24 PROCEDURE — 73564 X-RAY EXAM KNEE 4 OR MORE: CPT | Mod: LT

## 2025-07-24 PROCEDURE — 99203 OFFICE O/P NEW LOW 30 MIN: CPT
